# Patient Record
Sex: MALE | Race: WHITE | NOT HISPANIC OR LATINO | Employment: STUDENT | URBAN - METROPOLITAN AREA
[De-identification: names, ages, dates, MRNs, and addresses within clinical notes are randomized per-mention and may not be internally consistent; named-entity substitution may affect disease eponyms.]

---
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
## 2019-01-03 ENCOUNTER — EVALUATION (OUTPATIENT)
Dept: PHYSICAL THERAPY | Facility: CLINIC | Age: 19
End: 2019-01-03
Payer: COMMERCIAL

## 2019-01-03 DIAGNOSIS — S63.501D SPRAIN OF RIGHT WRIST, SUBSEQUENT ENCOUNTER: Primary | ICD-10-CM

## 2019-01-03 PROCEDURE — G8992 OTHER PT/OT  D/C STATUS: HCPCS | Performed by: PHYSICAL THERAPIST

## 2019-01-03 PROCEDURE — 97112 NEUROMUSCULAR REEDUCATION: CPT | Performed by: PHYSICAL THERAPIST

## 2019-01-03 PROCEDURE — G8991 OTHER PT/OT GOAL STATUS: HCPCS | Performed by: PHYSICAL THERAPIST

## 2019-01-03 NOTE — PROGRESS NOTES
PT Discharge    Today's date: 1/3/2019  Patient name: Felipe Agee  : 2000  MRN: [id number]  Referring provider: Adina Hong MD  Dx:   Encounter Diagnosis     ICD-10-CM    1  Sprain of right wrist, subsequent encounter S63 501D                   Assessment  Assessment details: Patient is a 22 y o  male who presents with a resolution of his impairments  He will be d/c from PT at this time  Thank you for the referral    Understanding of Dx/Px/POC: good   Prognosis: good    Goals  Impairment Goals  - Decrease pain by 50% in 2 weeks  - met  - Increased PROM throughout right wristall planes to WNL in 4 weeks  - met  - Increase right wrist strength golbally to 5/5 in 4 weeks  - met  -  strength will be symmetrical B in 4 weeks  - met    Functional Goals  - Return to Prior Level of Function in 4 weeks  - met  - Patient will be independent with HEP in 4 weeks  - met    Plan  Plan details: D/C from PT  Frequency: 2-3x week  Treatment plan discussed with: patient        Subjective Evaluation    History of Present Illness  Mechanism of injury: Pt notes a 100% improvement  He states he has been cleared by MD to slowly return to working out  He has no c/o pain and states he has been bench pressing without issues  Pain  Current pain ratin  At best pain ratin  At worst pain ratin  Location: R wrist      Diagnostic Tests  X-ray: abnormal    FCE comments:  Slight widening of the scapholunate interval upon review of his x-ray visible on the AP view  Patient Goals  Patient goals for therapy: decreased pain, independence with ADLs/IADLs, return to sport/leisure activities, increased strength and increased motion          Objective     Palpation     Additional Palpation Details  Mild tenderness at the dorsum scaphoidlunate interval  Pt to f/u with MD if this does not continue to improve or becomes worse      Neurological Testing     Sensation     Wrist/Hand   Left   Intact: light touch    Right Intact: light touch    Reflexes   Left   Deltoid (C5): normal (2+)  Biceps (C5/C6): normal (2+)  Brachioradialis (C6): normal (2+)  Triceps (C7): normal (2+)    Right   Deltoid (C5): normal (2+)  Biceps (C5/C6): normal (2+)  Brachioradialis (C6): normal (2+)  Triceps (C7): normal (2+)    Active Range of Motion     Right Wrist   Wrist flexion: WFL  Wrist extension: WFl  Radial deviation: WFL  Ulnar deviation: WFL    Passive Range of Motion     Right Wrist   Wrist flexion: WFL  Wrist extension: WFL  Radial deviation: WFL  Ulnar deviation: WFL     Strength/Myotome Testing     Left Wrist/Hand      (2nd hand position)     Trial 1: 85    Trial 2: 85    Trial 3: 85    Comments: At neutral    Right Wrist/Hand   Wrist extension: 5  Wrist flexion: 5  Radial deviation: 5  Ulnar deviation: 5     (2nd hand position)     Trial 1: 75    Trial 2: 70    Trial 3: 70    Comments: At neutral    Tests     Right Wrist/Hand   Negative distal radial-ulnar joint stress, scapholunate shear and Solano's  Swelling     Left Wrist/Hand   Circumference wrist: 16 cm    Right Wrist/Hand   Circumference wrist: 16 cm      Flowsheet Rows      Most Recent Value   PT/OT G-Codes   Current Score  74   Projected Score  80   FOTO information reviewed  Yes   Assessment Type  Discharge   G code set  Other PT/OT Primary   Other PT Primary Goal Status ()  CJ   Other PT Primary Discharge Status ()  [address] [place] [address]          Daily Treatment Diary   Re-eval only

## 2019-01-18 ENCOUNTER — OFFICE VISIT (OUTPATIENT)
Dept: OBGYN CLINIC | Facility: CLINIC | Age: 19
End: 2019-01-18
Payer: COMMERCIAL

## 2019-01-18 VITALS — BODY MASS INDEX: 27.87 KG/M2 | HEIGHT: 72 IN | WEIGHT: 205.8 LBS

## 2019-01-18 DIAGNOSIS — M25.531 PAIN IN RIGHT WRIST: Primary | ICD-10-CM

## 2019-01-18 PROCEDURE — 99213 OFFICE O/P EST LOW 20 MIN: CPT | Performed by: ORTHOPAEDIC SURGERY

## 2019-01-18 NOTE — PROGRESS NOTES
Assessment/Plan:  1  Pain in right wrist  MRI wrist right wo contrast       The patient unfortunately is still suffering with pain almost 2 months out from his injury despite extensive conservative treatment thus far  We are ordering an MRI to rule out a subtle scaphoid fracture that may not have been visible on x-ray and also a scapholunate ligament tear  He will follow up with our hand specialist, Dr Alyson Larose, after this study has been done read to discuss the results and how to proceed  Subjective:   Gray Starr is a 22 y o  male who presents today for follow-up of his right wrist   It has now been almost 2 months since he injured his wrist while weight lifting  He has done extensive therapy for his hand as well as bracing and ice  He had initially been making some progress, however he feels he has plateaued in still notes pain about the radial aspect of the wrist which is worse with activity and slightly better with rest   He is unable to do heavy lifting due to his wrist pain  He notes good sensation of the right hand and fair range of motion of the wrist       Review of Systems   Constitutional: Negative for chills, fever and unexpected weight change  HENT: Negative for hearing loss, nosebleeds and sore throat  Eyes: Negative for pain, redness and visual disturbance  Respiratory: Negative for cough, shortness of breath and wheezing  Cardiovascular: Negative for chest pain, palpitations and leg swelling  Gastrointestinal: Negative for abdominal pain, nausea and vomiting  Endocrine: Negative for polyphagia and polyuria  Genitourinary: Negative for dysuria and hematuria  Musculoskeletal:        See HPI   Skin: Negative for rash and wound  Neurological: Negative for dizziness, numbness and headaches  Psychiatric/Behavioral: Negative for decreased concentration and suicidal ideas  The patient is not nervous/anxious  No past medical history on file      Past Surgical History:   Procedure Laterality Date    EYE SURGERY      HERNIA REPAIR      WISDOM TOOTH EXTRACTION         Family History   Problem Relation Age of Onset    No Known Problems Mother     Hypertension Father     Depression Father     No Known Problems Brother     COPD Paternal Grandfather     Hypertension Paternal Grandfather     Arthritis Family     Cancer Family     Osteoporosis Family     No Known Problems Sister     No Known Problems Maternal Aunt     No Known Problems Maternal Uncle     No Known Problems Paternal Aunt     No Known Problems Paternal Uncle     No Known Problems Maternal Grandmother     No Known Problems Maternal Grandfather     No Known Problems Paternal Grandmother     ADD / ADHD Neg Hx     Anesthesia problems Neg Hx     Clotting disorder Neg Hx     Collagen disease Neg Hx     Diabetes Neg Hx     Dislocations Neg Hx     Learning disabilities Neg Hx     Neurological problems Neg Hx     Rheumatologic disease Neg Hx     Scoliosis Neg Hx     Vascular Disease Neg Hx        Social History     Occupational History    Not on file  Social History Main Topics    Smoking status: Never Smoker    Smokeless tobacco: Never Used    Alcohol use No    Drug use: No    Sexual activity: Not on file       No current outpatient prescriptions on file  Allergies   Allergen Reactions    Cefzil [Cefprozil]        Objective: There were no vitals filed for this visit  Ortho Exam    Physical Exam   Constitutional: He is oriented to person, place, and time  He appears well-developed  HENT:   Head: Normocephalic and atraumatic  Eyes: Conjunctivae are normal    Neck: Neck supple  Cardiovascular: Intact distal pulses  Pulmonary/Chest: Effort normal  No respiratory distress  Neurological: He is alert and oriented to person, place, and time  Skin: Skin is warm and dry  Psychiatric: He has a normal mood and affect  His behavior is normal    Vitals reviewed        Right wrist:  Benign appearance  No appreciable swelling  Near full range of motion of the wrist   Tenderness over dorsa scaphoid and scapholunate ligament  Negative Solano test  Sensation intact  2+ Radial pulse

## 2019-03-09 ENCOUNTER — HOSPITAL ENCOUNTER (OUTPATIENT)
Dept: MRI IMAGING | Facility: HOSPITAL | Age: 19
Discharge: HOME | End: 2019-03-09
Payer: COMMERCIAL

## 2019-03-09 DIAGNOSIS — M25.531 PAIN IN RIGHT WRIST: ICD-10-CM

## 2019-03-09 PROCEDURE — 73221 MRI JOINT UPR EXTREM W/O DYE: CPT

## 2019-03-14 ENCOUNTER — OFFICE VISIT (OUTPATIENT)
Dept: URGENT CARE | Facility: CLINIC | Age: 19
End: 2019-03-14
Payer: COMMERCIAL

## 2019-03-14 VITALS
SYSTOLIC BLOOD PRESSURE: 132 MMHG | WEIGHT: 202.4 LBS | OXYGEN SATURATION: 99 % | HEART RATE: 61 BPM | TEMPERATURE: 98.8 F | DIASTOLIC BLOOD PRESSURE: 70 MMHG | RESPIRATION RATE: 20 BRPM | HEIGHT: 74 IN | BODY MASS INDEX: 25.98 KG/M2

## 2019-03-14 DIAGNOSIS — R11.0 NAUSEA: Primary | ICD-10-CM

## 2019-03-14 PROCEDURE — 99203 OFFICE O/P NEW LOW 30 MIN: CPT | Performed by: NURSE PRACTITIONER

## 2019-03-14 PROCEDURE — S9088 SERVICES PROVIDED IN URGENT: HCPCS | Performed by: NURSE PRACTITIONER

## 2019-03-14 RX ORDER — ONDANSETRON 4 MG/1
4 TABLET, ORALLY DISINTEGRATING ORAL EVERY 8 HOURS PRN
Qty: 20 TABLET | Refills: 0 | Status: SHIPPED | OUTPATIENT
Start: 2019-03-14 | End: 2019-04-23 | Stop reason: ALTCHOICE

## 2019-03-14 NOTE — PATIENT INSTRUCTIONS
Zofran every 8 hours as needed - wait 30-45 min before eating/drinking  Increase fluids   Serena diet   Eliminate additional unnecessary supplements   Follow up with your PCP for worsening symptoms     Acute Nausea and Vomiting   WHAT YOU NEED TO KNOW:   Acute nausea and vomiting start suddenly, worsen quickly, and last a short time  DISCHARGE INSTRUCTIONS:   Return to the emergency department if:   · You see blood in your vomit or your bowel movements  · You have sudden, severe pain in your chest and upper abdomen after hard vomiting or retching  · You have swelling in your neck and chest      · You are dizzy, cold, and thirsty and your eyes and mouth are dry  · You are urinating very little or not at all  · You have muscle weakness, leg cramps, and trouble breathing  · Your heart is beating much faster than normal      · You continue to vomit for more than 48 hours  Contact your healthcare provider if:   · You have frequent dry heaves (vomiting but nothing comes out)  · Your nausea and vomiting does not get better or go away after you use medicine  · You have questions or concerns about your condition or treatment  Medicines: You may need any of the following:  · Medicines  may be given to calm your stomach and stop your vomiting  You may also need medicines to help you feel more relaxed or to stop nausea and vomiting caused by motion sickness  · Gastrointestinal stimulants  are used to help empty your stomach and bowels  This may help decrease nausea and vomiting  · Take your medicine as directed  Contact your healthcare provider if you think your medicine is not helping or if you have side effects  Tell him or her if you are allergic to any medicine  Keep a list of the medicines, vitamins, and herbs you take  Include the amounts, and when and why you take them  Bring the list or the pill bottles to follow-up visits   Carry your medicine list with you in case of an emergency  Prevent or manage acute nausea and vomiting:   · Do not drink alcohol  Alcohol may upset or irritate your stomach  Too much alcohol can also cause acute nausea and vomiting  · Control stress  Headaches due to stress may cause nausea and vomiting  Find ways to relax and manage your stress  Get more rest and sleep  · Drink more liquids as directed  Vomiting can lead to dehydration  It is important to drink more liquids to help replace lost body fluids  Ask your healthcare provider how much liquid to drink each day and which liquids are best for you  Your provider may recommend that you drink an oral rehydration solution (ORS)  ORS contains water, salts, and sugar that are needed to replace the lost body fluids  Ask what kind of ORS to use, how much to drink, and where to get it  · Eat smaller meals, more often  Eat small amounts of food every 2 to 3 hours, even if you are not hungry  Food in your stomach may decrease your nausea  · Talk to your healthcare provider before you take over-the-counter (OTC) medicines  These medicines can cause serious problems if you use certain other medicines, or you have a medical condition  You may have problems if you use too much or use them for longer than the label says  Follow directions on the label carefully  Follow up with your healthcare provider as directed:  Write down your questions so you remember to ask them during your follow-up visits  © 2017 [employer] Information is for End User's use only and may not be sold, redistributed or otherwise used for commercial purposes  All illustrations and images included in CareNotes® are the copyrighted property of [employer]  or [employer]  The above information is an  only  It is not intended as medical advice for individual conditions or treatments   Talk to your doctor, nurse or pharmacist before following any medical regimen to see if it is safe and effective for you

## 2019-03-15 NOTE — PROGRESS NOTES
[facility]      NAME: Myrtle Sanders is a 22 y o  male  : 2000    MRN: [id number]  DATE: 2019  TIME: 8:09 PM    Assessment and Plan   Nausea [R11 0]  1  Nausea  ondansetron (ZOFRAN-ODT) 4 mg disintegrating tablet       Patient Instructions   Zofran every 8 hours as needed - wait 30-45 min before eating/drinking  Increase fluids   Tyler diet   Eliminate additional unnecessary supplements   Follow up with your PCP for worsening symptoms     Chief Complaint     Chief Complaint   Patient presents with    Nausea     fatigue and HA x 3 days      History of Present Illness       Patient is a 50-year-old male presenting with chills, night sweats, decreased appetite, and nausea for the past 3 days  He denies fever, sore throat, or ear pain  He has mild epigastric pain but denies vomiting  He has had similar symptoms in the past but was never evaluated  He took Advil at 9 o'clock this morning with no relief of symptoms  He states that he has been using a pre workout supplement and thinks that may be related to his symptoms  Review of Systems   Review of Systems   Constitutional: Positive for appetite change, chills and diaphoresis  Negative for activity change and fever  HENT: Negative for congestion, ear pain, sinus pain and sore throat  Respiratory: Negative for cough and shortness of breath  Gastrointestinal: Positive for abdominal pain and nausea  Negative for diarrhea and vomiting  Skin: Negative for color change and wound  Neurological: Negative for weakness, numbness and headaches           Current Medications     Current Outpatient Medications:     ondansetron (ZOFRAN-ODT) 4 mg disintegrating tablet, Take 1 tablet (4 mg total) by mouth every 8 (eight) hours as needed for nausea or vomiting, Disp: 20 tablet, Rfl: 0    Current Allergies     Allergies as of 2019 - Reviewed 2019   Allergen Reaction Noted    Cefzil [cefprozil]  2016            The following portions of the patient's history were reviewed and updated as appropriate: allergies, current medications, past family history, past medical history, past social history, past surgical history and problem list      History reviewed  No pertinent past medical history  Past Surgical History:   Procedure Laterality Date    EYE SURGERY      HERNIA REPAIR      WISDOM TOOTH EXTRACTION         Family History   Problem Relation Age of Onset    No Known Problems Mother     Hypertension Father     Depression Father     No Known Problems Brother     COPD Paternal Grandfather     Hypertension Paternal Grandfather     Arthritis Family     Cancer Family     Osteoporosis Family     No Known Problems Sister     No Known Problems Maternal Aunt     No Known Problems Maternal Uncle     No Known Problems Paternal Aunt     No Known Problems Paternal Uncle     No Known Problems Maternal Grandmother     No Known Problems Maternal Grandfather     No Known Problems Paternal Grandmother     ADD / ADHD Neg Hx     Anesthesia problems Neg Hx     Clotting disorder Neg Hx     Collagen disease Neg Hx     Diabetes Neg Hx     Dislocations Neg Hx     Learning disabilities Neg Hx     Neurological problems Neg Hx     Rheumatologic disease Neg Hx     Scoliosis Neg Hx     Vascular Disease Neg Hx          Medications have been verified  Objective   /70 (BP Location: Right arm, Patient Position: Sitting, Cuff Size: Standard)   Pulse 61   Temp 98 8 °F (37 1 °C) (Temporal)   Resp 20   Ht 6' 2" (1 88 m)   Wt 91 8 kg (202 lb 6 4 oz)   SpO2 99%   BMI 25 99 kg/m²        Physical Exam     Physical Exam   Constitutional: He is oriented to person, place, and time  Vital signs are normal  He appears well-developed and well-nourished  He is active  HENT:   Head: Normocephalic     Right Ear: Tympanic membrane, external ear and ear canal normal    Left Ear: Tympanic membrane, external ear and ear canal normal    Nose: Nose normal    Mouth/Throat: Oropharynx is clear and moist    Cardiovascular: Normal rate, regular rhythm, S1 normal, S2 normal and normal heart sounds  Pulmonary/Chest: Effort normal  No respiratory distress  He has no decreased breath sounds  He has no wheezes  He has no rhonchi  He has no rales  Neurological: He is alert and oriented to person, place, and time  GCS eye subscore is 4  GCS verbal subscore is 5  GCS motor subscore is 6  Skin: Skin is warm and dry

## 2019-03-16 ENCOUNTER — HOSPITAL ENCOUNTER (EMERGENCY)
Facility: HOSPITAL | Age: 19
Discharge: HOME | End: 2019-03-16
Attending: EMERGENCY MEDICINE | Admitting: EMERGENCY MEDICINE
Payer: COMMERCIAL

## 2019-03-16 ENCOUNTER — APPOINTMENT (EMERGENCY)
Dept: CT IMAGING | Facility: HOSPITAL | Age: 19
End: 2019-03-16
Payer: COMMERCIAL

## 2019-03-16 VITALS
OXYGEN SATURATION: 99 % | TEMPERATURE: 97.7 F | WEIGHT: 203.93 LBS | DIASTOLIC BLOOD PRESSURE: 65 MMHG | HEART RATE: 53 BPM | BODY MASS INDEX: 27.03 KG/M2 | HEIGHT: 73 IN | SYSTOLIC BLOOD PRESSURE: 117 MMHG | RESPIRATION RATE: 18 BRPM

## 2019-03-16 DIAGNOSIS — R11.0 NAUSEA: ICD-10-CM

## 2019-03-16 DIAGNOSIS — R10.13 EPIGASTRIC ABDOMINAL PAIN: Primary | ICD-10-CM

## 2019-03-16 LAB
ALBUMIN SERPL BCP-MCNC: 4.1 G/DL (ref 3.5–5)
ALP SERPL-CCNC: 128 U/L (ref 46–484)
ALT SERPL W P-5'-P-CCNC: 53 U/L (ref 12–78)
ANION GAP SERPL CALCULATED.3IONS-SCNC: 13 MMOL/L (ref 4–13)
AST SERPL W P-5'-P-CCNC: 41 U/L (ref 5–45)
BACTERIA UR QL AUTO: NORMAL /HPF
BASOPHILS # BLD AUTO: 0.03 THOUSANDS/ΜL (ref 0–0.1)
BASOPHILS NFR BLD AUTO: 1 % (ref 0–1)
BILIRUB SERPL-MCNC: 0.3 MG/DL (ref 0.2–1)
BILIRUB UR QL STRIP: NEGATIVE
BUN SERPL-MCNC: 17 MG/DL (ref 5–25)
CALCIUM SERPL-MCNC: 9.5 MG/DL (ref 8.3–10.1)
CHLORIDE SERPL-SCNC: 104 MMOL/L (ref 100–108)
CLARITY UR: CLEAR
CO2 SERPL-SCNC: 26 MMOL/L (ref 21–32)
COLOR UR: YELLOW
CREAT SERPL-MCNC: 1.28 MG/DL (ref 0.6–1.3)
EOSINOPHIL # BLD AUTO: 0.09 THOUSAND/ΜL (ref 0–0.61)
EOSINOPHIL NFR BLD AUTO: 1 % (ref 0–6)
ERYTHROCYTE [DISTWIDTH] IN BLOOD BY AUTOMATED COUNT: 11.9 % (ref 11.6–15.1)
GFR SERPL CREATININE-BSD FRML MDRD: 81 ML/MIN/1.73SQ M
GLUCOSE SERPL-MCNC: 87 MG/DL (ref 65–140)
GLUCOSE UR STRIP-MCNC: NEGATIVE MG/DL
HCT VFR BLD AUTO: 45.9 % (ref 36.5–49.3)
HGB BLD-MCNC: 15.2 G/DL (ref 12–17)
HGB UR QL STRIP.AUTO: ABNORMAL
IMM GRANULOCYTES # BLD AUTO: 0.01 THOUSAND/UL (ref 0–0.2)
IMM GRANULOCYTES NFR BLD AUTO: 0 % (ref 0–2)
KETONES UR STRIP-MCNC: NEGATIVE MG/DL
LEUKOCYTE ESTERASE UR QL STRIP: NEGATIVE
LIPASE SERPL-CCNC: 151 U/L (ref 73–393)
LYMPHOCYTES # BLD AUTO: 2.42 THOUSANDS/ΜL (ref 0.6–4.47)
LYMPHOCYTES NFR BLD AUTO: 39 % (ref 14–44)
MCH RBC QN AUTO: 28.8 PG (ref 26.8–34.3)
MCHC RBC AUTO-ENTMCNC: 33.1 G/DL (ref 31.4–37.4)
MCV RBC AUTO: 87 FL (ref 82–98)
MONOCYTES # BLD AUTO: 0.5 THOUSAND/ΜL (ref 0.17–1.22)
MONOCYTES NFR BLD AUTO: 8 % (ref 4–12)
NEUTROPHILS # BLD AUTO: 3.23 THOUSANDS/ΜL (ref 1.85–7.62)
NEUTS SEG NFR BLD AUTO: 51 % (ref 43–75)
NITRITE UR QL STRIP: NEGATIVE
NON-SQ EPI CELLS URNS QL MICRO: NORMAL /HPF
NRBC BLD AUTO-RTO: 0 /100 WBCS
PH UR STRIP.AUTO: 6.5 [PH]
PLATELET # BLD AUTO: 288 THOUSANDS/UL (ref 149–390)
PMV BLD AUTO: 9.6 FL (ref 8.9–12.7)
POTASSIUM SERPL-SCNC: 4 MMOL/L (ref 3.5–5.3)
PROT SERPL-MCNC: 7.9 G/DL (ref 6.4–8.2)
PROT UR STRIP-MCNC: NEGATIVE MG/DL
RBC # BLD AUTO: 5.28 MILLION/UL (ref 3.88–5.62)
RBC #/AREA URNS AUTO: NORMAL /HPF
SODIUM SERPL-SCNC: 143 MMOL/L (ref 136–145)
SP GR UR STRIP.AUTO: 1.02 (ref 1–1.03)
UROBILINOGEN UR QL STRIP.AUTO: 0.2 E.U./DL
WBC # BLD AUTO: 6.28 THOUSAND/UL (ref 4.31–10.16)
WBC #/AREA URNS AUTO: NORMAL /HPF

## 2019-03-16 PROCEDURE — 99284 EMERGENCY DEPT VISIT MOD MDM: CPT

## 2019-03-16 PROCEDURE — 96375 TX/PRO/DX INJ NEW DRUG ADDON: CPT

## 2019-03-16 PROCEDURE — 81001 URINALYSIS AUTO W/SCOPE: CPT | Performed by: PHYSICIAN ASSISTANT

## 2019-03-16 PROCEDURE — 36415 COLL VENOUS BLD VENIPUNCTURE: CPT | Performed by: PHYSICIAN ASSISTANT

## 2019-03-16 PROCEDURE — 80053 COMPREHEN METABOLIC PANEL: CPT | Performed by: PHYSICIAN ASSISTANT

## 2019-03-16 PROCEDURE — 74177 CT ABD & PELVIS W/CONTRAST: CPT

## 2019-03-16 PROCEDURE — 96361 HYDRATE IV INFUSION ADD-ON: CPT

## 2019-03-16 PROCEDURE — 96374 THER/PROPH/DIAG INJ IV PUSH: CPT

## 2019-03-16 PROCEDURE — 85025 COMPLETE CBC W/AUTO DIFF WBC: CPT | Performed by: PHYSICIAN ASSISTANT

## 2019-03-16 PROCEDURE — 83690 ASSAY OF LIPASE: CPT | Performed by: PHYSICIAN ASSISTANT

## 2019-03-16 RX ORDER — FAMOTIDINE 20 MG/1
20 TABLET, FILM COATED ORAL EVERY 12 HOURS PRN
Qty: 14 TABLET | Refills: 0 | Status: SHIPPED | OUTPATIENT
Start: 2019-03-16 | End: 2019-04-23 | Stop reason: ALTCHOICE

## 2019-03-16 RX ORDER — KETOROLAC TROMETHAMINE 30 MG/ML
30 INJECTION, SOLUTION INTRAMUSCULAR; INTRAVENOUS ONCE
Status: COMPLETED | OUTPATIENT
Start: 2019-03-16 | End: 2019-03-16

## 2019-03-16 RX ORDER — ONDANSETRON 2 MG/ML
4 INJECTION INTRAMUSCULAR; INTRAVENOUS ONCE
Status: COMPLETED | OUTPATIENT
Start: 2019-03-16 | End: 2019-03-16

## 2019-03-16 RX ADMIN — IOHEXOL 100 ML: 350 INJECTION, SOLUTION INTRAVENOUS at 20:03

## 2019-03-16 RX ADMIN — ONDANSETRON 4 MG: 2 INJECTION INTRAMUSCULAR; INTRAVENOUS at 18:31

## 2019-03-16 RX ADMIN — SODIUM CHLORIDE 1000 ML: 0.9 INJECTION, SOLUTION INTRAVENOUS at 18:31

## 2019-03-16 RX ADMIN — KETOROLAC TROMETHAMINE 30 MG: 30 INJECTION, SOLUTION INTRAMUSCULAR; INTRAVENOUS at 18:35

## 2019-03-16 NOTE — ED PROVIDER NOTES
History  Chief Complaint   Patient presents with    Abdominal Pain     Pt presents to ED from home w/ umbilical abd pain intermittently for three days, increasing at night  Pt (+) nausea  Pt seen by urgent care w/ no relief  Pt (-) health hx  Tuan Pacheco is a 22 y o  male who presents to the ED with complaints of constant epigastric abdominal pain, nausea, decreased appetitie and night sweats x 5 days  Patient states he had a previous episode of similar pain as a child which resolved on its own  Patient was seen at urgent care on 03/14/19, prescribed Zofran without relief  Patient denies association with food or eating  Patient is using a new pre work-out which he has been taking over the past 3 weeks  Denies vomiting, diarrhea, blood in stool, urinary frequency, urinary urgency some hematuria, dysuria, penile pain, penile discharge, testicular pain, recent unprotected sex, cough, sore throat, nasal congestion, fever  Patient is up-to-date on vaccinations  History provided by:  Parent and patient  Abdominal Pain   Pain location:  Epigastric  Pain quality: aching and throbbing    Pain severity:  Severe  Onset quality:  Sudden  Duration:  5 days  Timing:  Constant  Progression:  Unchanged  Context: previous surgery    Context: not alcohol use, not recent sexual activity and not sick contacts    Ineffective treatments:  NSAIDs  Associated symptoms: anorexia, chills and nausea    Associated symptoms: no belching, no chest pain, no constipation, no cough, no diarrhea, no dysuria, no fatigue, no fever, no flatus, no hematemesis, no hematochezia, no hematuria, no melena, no shortness of breath, no sore throat and no vomiting        Prior to Admission Medications   Prescriptions Last Dose Informant Patient Reported?  Taking?   ondansetron (ZOFRAN-ODT) 4 mg disintegrating tablet   No No   Sig: Take 1 tablet (4 mg total) by mouth every 8 (eight) hours as needed for nausea or vomiting Facility-Administered Medications: None       History reviewed  No pertinent past medical history  Past Surgical History:   Procedure Laterality Date    EYE SURGERY      HERNIA REPAIR      WISDOM TOOTH EXTRACTION         Family History   Problem Relation Age of Onset    No Known Problems Mother     Hypertension Father     Depression Father     No Known Problems Brother     COPD Paternal Grandfather     Hypertension Paternal Grandfather     Arthritis Family     Cancer Family     Osteoporosis Family     No Known Problems Sister     No Known Problems Maternal Aunt     No Known Problems Maternal Uncle     No Known Problems Paternal Aunt     No Known Problems Paternal Uncle     No Known Problems Maternal Grandmother     No Known Problems Maternal Grandfather     No Known Problems Paternal Grandmother     ADD / ADHD Neg Hx     Anesthesia problems Neg Hx     Clotting disorder Neg Hx     Collagen disease Neg Hx     Diabetes Neg Hx     Dislocations Neg Hx     Learning disabilities Neg Hx     Neurological problems Neg Hx     Rheumatologic disease Neg Hx     Scoliosis Neg Hx     Vascular Disease Neg Hx      I have reviewed and agree with the history as documented  Social History     Tobacco Use    Smoking status: Never Smoker    Smokeless tobacco: Never Used   Substance Use Topics    Alcohol use: No    Drug use: No        Review of Systems   Constitutional: Positive for appetite change and chills  Negative for fatigue, fever and unexpected weight change  HENT: Negative for congestion, drooling, ear pain, rhinorrhea, sore throat, trouble swallowing and voice change  Eyes: Negative for pain, discharge, redness and visual disturbance  Respiratory: Negative for cough, shortness of breath, wheezing and stridor  Cardiovascular: Negative for chest pain, palpitations and leg swelling  Gastrointestinal: Positive for abdominal pain, anorexia and nausea   Negative for blood in stool, constipation, diarrhea, flatus, hematemesis, hematochezia, melena and vomiting  Genitourinary: Negative for dysuria, flank pain, frequency, hematuria and urgency  Musculoskeletal: Negative for gait problem, joint swelling, neck pain and neck stiffness  Skin: Negative for color change and rash  Neurological: Negative for dizziness, seizures, light-headedness and headaches  Physical Exam  Physical Exam   Constitutional: He is oriented to person, place, and time  He appears well-developed and well-nourished  No distress  HENT:   Head: Normocephalic and atraumatic  Nose: Nose normal    Mouth/Throat: Oropharynx is clear and moist    Eyes: Pupils are equal, round, and reactive to light  Conjunctivae and EOM are normal    Neck: Normal range of motion  Neck supple  Cardiovascular: Normal rate, regular rhythm and intact distal pulses  Pulmonary/Chest: Effort normal and breath sounds normal    Abdominal: Soft  Bowel sounds are normal  There is tenderness in the epigastric area and periumbilical area  There is tenderness at McBurney's point  There is no rigidity, no rebound, no guarding and no CVA tenderness  No peritoneal signs  Musculoskeletal: Normal range of motion  Neurological: He is alert and oriented to person, place, and time  He has normal strength  GCS eye subscore is 4  GCS verbal subscore is 5  GCS motor subscore is 6  Skin: Skin is warm and dry  Capillary refill takes less than 2 seconds  Psychiatric: He has a normal mood and affect  Nursing note and vitals reviewed        Vital Signs  ED Triage Vitals [03/16/19 1734]   Temperature Pulse Respirations Blood Pressure SpO2   97 7 °F (36 5 °C) 67 16 152/65 98 %      Temp Source Heart Rate Source Patient Position - Orthostatic VS BP Location FiO2 (%)   Oral Monitor Sitting Left arm --      Pain Score       8           Vitals:    03/16/19 1734 03/16/19 1857 03/16/19 2144   BP: 152/65 119/69 117/65   Pulse: 67 (!) 53 (!) 53 Patient Position - Orthostatic VS: Sitting Lying Lying       qSOFA     Row Name 03/16/19 2144 03/16/19 1857 03/16/19 1734          Altered mental status GCS < 15  --  --  --      Respiratory Rate > / =22  0  0  0      Systolic BP < / =977  0  0  0      Q Sofa Score  0  0  0            Visual Acuity      ED Medications  Medications   sodium chloride 0 9 % bolus 1,000 mL (0 mL Intravenous Stopped 3/16/19 1931)   ondansetron (ZOFRAN) injection 4 mg (4 mg Intravenous Given 3/16/19 1831)   ketorolac (TORADOL) injection 30 mg (30 mg Intravenous Given 3/16/19 1835)   iohexol (OMNIPAQUE) 350 MG/ML injection (MULTI-DOSE) 100 mL (100 mL Intravenous Given 3/16/19 2003)       Diagnostic Studies  Results Reviewed     Procedure Component Value Units Date/Time    Urine Microscopic [[id number]]  (Normal) Collected:  03/16/19 1837    Lab Status:  Final result Specimen:  Urine, Clean Catch Updated:  03/16/19 1915     RBC, UA None Seen /hpf      WBC, UA None Seen /hpf      Epithelial Cells None Seen /hpf      Bacteria, UA None Seen /hpf     Comprehensive metabolic panel [[id number]] Collected:  03/16/19 1829    Lab Status:  Final result Specimen:  Blood from Arm, Right Updated:  03/16/19 1906     Sodium 143 mmol/L      Potassium 4 0 mmol/L      Chloride 104 mmol/L      CO2 26 mmol/L      ANION GAP 13 mmol/L      BUN 17 mg/dL      Creatinine 1 28 mg/dL      Glucose 87 mg/dL      Calcium 9 5 mg/dL      AST 41 U/L      ALT 53 U/L      Alkaline Phosphatase 128 U/L      Total Protein 7 9 g/dL      Albumin 4 1 g/dL      Total Bilirubin 0 30 mg/dL      eGFR 81 ml/min/1 73sq m     Narrative:       National Kidney Disease Education Program recommendations are as follows:  GFR calculation is accurate only with a steady state creatinine  Chronic Kidney disease less than 60 ml/min/1 73 sq  meters  Kidney failure less than 15 ml/min/1 73 sq  meters      Lipase [[id number]]  (Normal) Collected:  03/16/19 1829    Lab Status:  Final result Specimen: Blood from Arm, Right Updated:  03/16/19 1906     Lipase 151 u/L     UA w Reflex to Microscopic [[id number]]  (Abnormal) Collected:  03/16/19 1837    Lab Status:  Final result Specimen:  Urine, Clean Catch Updated:  03/16/19 1853     Color, UA Yellow     Clarity, UA Clear     Specific [place], UA 1 020     pH, UA 6 5     Leukocytes, UA Negative     Nitrite, UA Negative     Protein, UA Negative mg/dl      Glucose, UA Negative mg/dl      Ketones, UA Negative mg/dl      Urobilinogen, UA 0 2 E U /dl      Bilirubin, UA Negative     Blood, UA Trace-lysed    CBC and differential [[id number]] Collected:  03/16/19 1829    Lab Status:  Final result Specimen:  Blood from Arm, Right Updated:  03/16/19 1841     WBC 6 28 Thousand/uL      RBC 5 28 Million/uL      Hemoglobin 15 2 g/dL      Hematocrit 45 9 %      MCV 87 fL      MCH 28 8 pg      MCHC 33 1 g/dL      RDW 11 9 %      MPV 9 6 fL      Platelets 854 Thousands/uL      nRBC 0 /100 WBCs      Neutrophils Relative 51 %      Immat GRANS % 0 %      Lymphocytes Relative 39 %      Monocytes Relative 8 %      Eosinophils Relative 1 %      Basophils Relative 1 %      Neutrophils Absolute 3 23 Thousands/µL      Immature Grans Absolute 0 01 Thousand/uL      Lymphocytes Absolute 2 42 Thousands/µL      Monocytes Absolute 0 50 Thousand/µL      Eosinophils Absolute 0 09 Thousand/µL      Basophils Absolute 0 03 Thousands/µL                  CT abdomen pelvis with contrast   Final Result by El Baltazar MD (03/16 2124)         No acute pathology visualized on CT of the abdomen and pelvis with IV without oral contrast           Workstation performed: [id number]                    Procedures  Procedures       Phone Contacts  ED Phone Contact    ED Course  ED Course as of Mar 16 2244   Sat Mar 16, 2019   2135 Educated patient regarding diagnosis and management  Advised patient to follow up with PCP  Advised patient to RTER for persistent or worsening symptoms  MDM  Number of Diagnoses or Management Options  Epigastric abdominal pain: new and does not require workup  Nausea: new and does not require workup  Diagnosis management comments: Differential diagnosis included but not limited to: GERD, appendicitis, gastroenteritis, colitis, PUD, pancreatitis, IBD, IBS     Labs WNL  Pain and nausea improved with IV medications  CT abdomen and pelvis without acute findings  Patient was instructed to follow up with outpatient gastroenterology  I provided patient with strict RTER precautions  I advised patient follow-up with PCP in 24-48 hours  Patient verbalized understanding  Amount and/or Complexity of Data Reviewed  Clinical lab tests: reviewed and ordered  Tests in the radiology section of CPT®: ordered and reviewed  Review and summarize past medical records: yes    Risk of Complications, Morbidity, and/or Mortality  Presenting problems: moderate  Diagnostic procedures: moderate  Management options: moderate    Patient Progress  Patient progress: improved      Disposition  Final diagnoses:   Epigastric abdominal pain   Nausea     Time reflects when diagnosis was documented in both MDM as applicable and the Disposition within this note     Time User Action Codes Description Comment    3/16/2019  9:32 PM Lizzy Serna Add [R10 13] Epigastric abdominal pain     3/16/2019  9:32 PM Lizzy Serna Add [R11 0] Nausea       ED Disposition     ED Disposition Condition Date/Time Comment    Discharge Stable Sat Mar 16, 2019  9:32 PM Flori Dubois discharge to home/self care              Follow-up Information     Follow up With Specialties Details Why Contact Info Additional [address] Emergency Department Emergency Medicine Go to  If symptoms worsen [address] [facility] [address] [phone] [facility] ED, [address], [place], [address]    Jayson Cockayne, MD Family Medicine Schedule an appointment as soon as possible for a visit   [address]  [facility] [address]  [phone]       [facility] [place] [facility] Schedule an appointment as soon as possible for a visit   [address] [facility] [address] [facility] [place], [address] [address] [place], [place], [phone]          Discharge Medication List as of 3/16/2019  9:33 PM      START taking these medications    Details   famotidine (PEPCID) 20 mg tablet Take 1 tablet (20 mg total) by mouth every 12 (twelve) hours as needed for indigestion or heartburn (Epigastric Pain), Starting Sat 3/16/2019, Print         CONTINUE these medications which have NOT CHANGED    Details   ondansetron (ZOFRAN-ODT) 4 mg disintegrating tablet Take 1 tablet (4 mg total) by mouth every 8 (eight) hours as needed for nausea or vomiting, Starting Thu 3/14/2019, Normal           No discharge procedures on file      ED Provider  Electronically Signed by           Evy Neumann PA-C  03/16/19 4614

## 2019-03-17 NOTE — DISCHARGE INSTRUCTIONS
Epigastric Pain   WHAT YOU NEED TO KNOW:   Epigastric pain is felt in the middle of the upper abdomen, between the ribs and the bellybutton  The pain may be mild or severe  Pain may spread from or to another part of your body  Epigastric pain may be a sign of a serious health problem that needs to be treated  DISCHARGE INSTRUCTIONS:   Call 911 for any of the following:   · You have any of the following signs of a heart attack:      ¨ Squeezing, pressure, or pain in your chest that lasts longer than 5 minutes or returns    ¨ Discomfort or pain in your back, neck, jaw, stomach, or arm     ¨ Trouble breathing    ¨ Nausea or vomiting    ¨ Lightheadedness or a sudden cold sweat, especially with chest pain or trouble breathing    · You have severe pain that radiates to your jaw or back  Return to the emergency department if:   · You have severe pain that starts suddenly and quickly gets worse  · You cannot have a bowel movement and are vomiting  · You vomit or cough up blood  · You see blood in your urine or bowel movement  · You feel drowsy and your breathing is slower than usual   Contact your healthcare provider if:   · You have a fever or chills  · You have yellowing of your skin or the whites of your eyes  · You vomit often or several times in a row  · You lose weight without trying  · You have symptoms for longer than 2 weeks  · You have questions or concerns about your condition or care  Medicines:   · Medicines  may be given to treat pain or stop vomiting  You may also need medicines to reduce or control stomach acid, or treat an infection  · Take your medicine as directed  Contact your healthcare provider if you think your medicine is not helping or if you have side effects  Tell him of her if you are allergic to any medicine  Keep a list of the medicines, vitamins, and herbs you take  Include the amounts, and when and why you take them   Bring the list or the pill bottles to follow-up visits  Carry your medicine list with you in case of an emergency  Follow up with your healthcare provider as directed:  Write down your questions so you remember to ask them during your visits  Manage your symptoms:   · Keep a record of your symptoms  Include when the pain starts, how long it lasts, and if it is sharp or dull  Also include any foods you ate or activities you did before the pain started  Keep track of anything that helped the pain  · Eat a variety of healthy foods  Healthy foods include fruits, vegetables, whole-grain breads, low-fat dairy products, beans, lean meats, and fish  Ask if you need to be on a special diet  Certain foods may cause your pain, such as alcohol or foods that are high in fat  You may need to eat smaller meals and to eat more often than usual     · Drink liquids as directed  Ask how much liquid to drink each day and which liquids are best for you  Do not have drinks that contain alcohol or caffeine  © 2017 [address] [facility] Information is for End User's use only and may not be sold, redistributed or otherwise used for commercial purposes  All illustrations and images included in CareNotes® are the copyrighted property of [employer]  or [employer]  The above information is an  only  It is not intended as medical advice for individual conditions or treatments  Talk to your doctor, nurse or pharmacist before following any medical regimen to see if it is safe and effective for you

## 2019-03-18 NOTE — ED NOTES
Patient left medication on ground in room 9  Contacted to make aware        Gian Pennington RN  03/17/19 2123

## 2019-04-23 ENCOUNTER — OFFICE VISIT (OUTPATIENT)
Dept: FAMILY MEDICINE CLINIC | Facility: CLINIC | Age: 19
End: 2019-04-23
Payer: COMMERCIAL

## 2019-04-23 VITALS
DIASTOLIC BLOOD PRESSURE: 68 MMHG | BODY MASS INDEX: 26.4 KG/M2 | WEIGHT: 199.2 LBS | TEMPERATURE: 98.6 F | SYSTOLIC BLOOD PRESSURE: 118 MMHG | OXYGEN SATURATION: 98 % | HEIGHT: 73 IN | RESPIRATION RATE: 18 BRPM | HEART RATE: 69 BPM

## 2019-04-23 DIAGNOSIS — R10.13 EPIGASTRIC PAIN: Primary | ICD-10-CM

## 2019-04-23 PROCEDURE — 3008F BODY MASS INDEX DOCD: CPT | Performed by: PHYSICIAN ASSISTANT

## 2019-04-23 PROCEDURE — 99214 OFFICE O/P EST MOD 30 MIN: CPT | Performed by: PHYSICIAN ASSISTANT

## 2019-04-23 RX ORDER — OMEPRAZOLE 20 MG/1
20 CAPSULE, DELAYED RELEASE ORAL DAILY
COMMUNITY
End: 2019-04-23 | Stop reason: SDUPTHER

## 2019-04-23 RX ORDER — OMEPRAZOLE 20 MG/1
20 CAPSULE, DELAYED RELEASE ORAL DAILY
Qty: 30 CAPSULE | Refills: 0 | Status: ON HOLD | OUTPATIENT
Start: 2019-04-23 | End: 2019-04-24 | Stop reason: DRUGHIGH

## 2019-04-24 ENCOUNTER — ANESTHESIA EVENT (OUTPATIENT)
Dept: GASTROENTEROLOGY | Facility: MEDICAL CENTER | Age: 19
End: 2019-04-24
Payer: COMMERCIAL

## 2019-04-24 ENCOUNTER — ANESTHESIA (OUTPATIENT)
Dept: GASTROENTEROLOGY | Facility: MEDICAL CENTER | Age: 19
End: 2019-04-24
Payer: COMMERCIAL

## 2019-04-24 ENCOUNTER — HOSPITAL ENCOUNTER (OUTPATIENT)
Facility: MEDICAL CENTER | Age: 19
Setting detail: OUTPATIENT SURGERY
Discharge: HOME | End: 2019-04-24
Attending: INTERNAL MEDICINE | Admitting: INTERNAL MEDICINE
Payer: COMMERCIAL

## 2019-04-24 VITALS
BODY MASS INDEX: 25.84 KG/M2 | DIASTOLIC BLOOD PRESSURE: 61 MMHG | TEMPERATURE: 99.2 F | OXYGEN SATURATION: 98 % | HEIGHT: 73 IN | RESPIRATION RATE: 23 BRPM | HEART RATE: 48 BPM | SYSTOLIC BLOOD PRESSURE: 128 MMHG | WEIGHT: 195 LBS

## 2019-04-24 DIAGNOSIS — R10.10 PAIN OF UPPER ABDOMEN: ICD-10-CM

## 2019-04-24 DIAGNOSIS — K20.90 ESOPHAGITIS: Primary | ICD-10-CM

## 2019-04-24 PROCEDURE — NC001 PR NO CHARGE: Performed by: INTERNAL MEDICINE

## 2019-04-24 PROCEDURE — 88305 TISSUE EXAM BY PATHOLOGIST: CPT | Performed by: PATHOLOGY

## 2019-04-24 PROCEDURE — 43239 EGD BIOPSY SINGLE/MULTIPLE: CPT | Performed by: INTERNAL MEDICINE

## 2019-04-24 RX ORDER — SODIUM CHLORIDE 9 MG/ML
125 INJECTION, SOLUTION INTRAVENOUS CONTINUOUS
Status: DISCONTINUED | OUTPATIENT
Start: 2019-04-24 | End: 2019-04-24 | Stop reason: HOSPADM

## 2019-04-24 RX ORDER — OMEPRAZOLE 40 MG/1
40 CAPSULE, DELAYED RELEASE ORAL DAILY
Qty: 30 CAPSULE | Refills: 3 | Status: SHIPPED | OUTPATIENT
Start: 2019-04-24 | End: 2019-04-24 | Stop reason: SDUPTHER

## 2019-04-24 RX ORDER — OMEPRAZOLE 40 MG/1
40 CAPSULE, DELAYED RELEASE ORAL DAILY
Qty: 30 CAPSULE | Refills: 3 | Status: SHIPPED | OUTPATIENT
Start: 2019-04-24 | End: 2019-07-16

## 2019-04-24 RX ORDER — LIDOCAINE HYDROCHLORIDE 20 MG/ML
INJECTION, SOLUTION EPIDURAL; INFILTRATION; INTRACAUDAL; PERINEURAL AS NEEDED
Status: DISCONTINUED | OUTPATIENT
Start: 2019-04-24 | End: 2019-04-24 | Stop reason: SURG

## 2019-04-24 RX ORDER — PROPOFOL 10 MG/ML
INJECTION, EMULSION INTRAVENOUS AS NEEDED
Status: DISCONTINUED | OUTPATIENT
Start: 2019-04-24 | End: 2019-04-24 | Stop reason: SURG

## 2019-04-24 RX ADMIN — SODIUM CHLORIDE 125 ML/HR: 0.9 INJECTION, SOLUTION INTRAVENOUS at 14:31

## 2019-04-24 RX ADMIN — PROPOFOL 50 MG: 10 INJECTION, EMULSION INTRAVENOUS at 14:42

## 2019-04-24 RX ADMIN — PROPOFOL 200 MG: 10 INJECTION, EMULSION INTRAVENOUS at 14:39

## 2019-04-24 RX ADMIN — PROPOFOL 100 MG: 10 INJECTION, EMULSION INTRAVENOUS at 14:40

## 2019-04-24 RX ADMIN — PROPOFOL 50 MG: 10 INJECTION, EMULSION INTRAVENOUS at 14:45

## 2019-04-24 RX ADMIN — PROPOFOL 50 MG: 10 INJECTION, EMULSION INTRAVENOUS at 14:44

## 2019-04-24 RX ADMIN — LIDOCAINE HYDROCHLORIDE 5 ML: 20 INJECTION, SOLUTION EPIDURAL; INFILTRATION; INTRACAUDAL; PERINEURAL at 14:39

## 2019-04-24 RX ADMIN — PROPOFOL 50 MG: 10 INJECTION, EMULSION INTRAVENOUS at 14:47

## 2019-04-29 ENCOUNTER — TELEPHONE (OUTPATIENT)
Dept: GASTROENTEROLOGY | Facility: MEDICAL CENTER | Age: 19
End: 2019-04-29

## 2019-05-11 ENCOUNTER — HOSPITAL ENCOUNTER (OUTPATIENT)
Dept: ULTRASOUND IMAGING | Facility: HOSPITAL | Age: 19
Discharge: HOME | End: 2019-05-11
Attending: INTERNAL MEDICINE
Payer: COMMERCIAL

## 2019-05-11 DIAGNOSIS — R10.10 PAIN OF UPPER ABDOMEN: ICD-10-CM

## 2019-05-11 PROCEDURE — 76705 ECHO EXAM OF ABDOMEN: CPT

## 2019-05-15 ENCOUNTER — TELEPHONE (OUTPATIENT)
Dept: GASTROENTEROLOGY | Facility: AMBULARY SURGERY CENTER | Age: 19
End: 2019-05-15

## 2019-05-15 DIAGNOSIS — R10.9 ABDOMINAL PAIN, UNSPECIFIED ABDOMINAL LOCATION: Primary | ICD-10-CM

## 2019-05-22 ENCOUNTER — TELEPHONE (OUTPATIENT)
Dept: GASTROENTEROLOGY | Facility: MEDICAL CENTER | Age: 19
End: 2019-05-22

## 2019-05-23 ENCOUNTER — HOSPITAL ENCOUNTER (OUTPATIENT)
Dept: NUCLEAR MEDICINE | Facility: HOSPITAL | Age: 19
Discharge: HOME | End: 2019-05-23
Payer: COMMERCIAL

## 2019-05-23 DIAGNOSIS — R10.9 ABDOMINAL PAIN, UNSPECIFIED ABDOMINAL LOCATION: ICD-10-CM

## 2019-05-23 PROCEDURE — A9537 TC99M MEBROFENIN: HCPCS

## 2019-05-23 PROCEDURE — 78227 HEPATOBIL SYST IMAGE W/DRUG: CPT

## 2019-05-23 RX ADMIN — SINCALIDE 1.8 MCG: 5 INJECTION, POWDER, LYOPHILIZED, FOR SOLUTION INTRAVENOUS at 09:00

## 2019-07-16 ENCOUNTER — OFFICE VISIT (OUTPATIENT)
Dept: GASTROENTEROLOGY | Facility: MEDICAL CENTER | Age: 19
End: 2019-07-16
Payer: COMMERCIAL

## 2019-07-16 VITALS
SYSTOLIC BLOOD PRESSURE: 120 MMHG | DIASTOLIC BLOOD PRESSURE: 70 MMHG | BODY MASS INDEX: 26.51 KG/M2 | HEIGHT: 73 IN | HEART RATE: 76 BPM | TEMPERATURE: 97.8 F | WEIGHT: 200 LBS

## 2019-07-16 DIAGNOSIS — K21.00 GASTROESOPHAGEAL REFLUX DISEASE WITH ESOPHAGITIS: Primary | ICD-10-CM

## 2019-07-16 PROBLEM — K21.9 GERD (GASTROESOPHAGEAL REFLUX DISEASE): Status: ACTIVE | Noted: 2019-07-16

## 2019-07-16 PROCEDURE — 99213 OFFICE O/P EST LOW 20 MIN: CPT | Performed by: INTERNAL MEDICINE

## 2019-07-16 RX ORDER — OMEPRAZOLE 20 MG/1
20 CAPSULE, DELAYED RELEASE ORAL DAILY
Qty: 30 CAPSULE | Refills: 2 | Status: SHIPPED | OUTPATIENT
Start: 2019-07-16 | End: 2019-11-08

## 2019-07-16 NOTE — PATIENT INSTRUCTIONS
1  Omeprazole 20 mg daily  2  If the omeprazole is not working or makes you feel foggy then will try Prevacid 15 mg (OTC) or 30 mg    3  Eventually after next month will taper to every other day for 2 weeks then stop  4  Control diet (keep food diary)  5  If symptoms occur once in a while may take Zantac 150 mg as needed (over the counter)  6  If symptoms occurring more than 2 - 3 / week then go back on omeprazole or Prevacid and let us know  7  If symptoms persist then will consider STRETTA or surgery for reflux but will need pH and manometry (esophageal pressure) study before that  Gastroesophageal Reflux Disease   AMBULATORY CARE:   Gastroesophageal reflux  reflux occurs when acid and food in the stomach back up into the esophagus  Gastroesophageal reflux disease (GERD) is reflux that occurs more than twice a week for a few weeks  It usually causes heartburn and other symptoms  GERD can cause other health problems over time if it is not treated  Common symptoms include:  Heartburn is the most common symptom of GERD  You may feel burning pain in your chest or below the breast bone  This usually occurs after meals and spreads to your neck, jaw, or shoulder  The pain gets better when you change positions  You may also have any of the following:  · Bitter or acid taste in your mouth    · Dry cough    · Trouble swallowing or pain with swallowing    · Hoarseness or sore throat    · Frequent burping or hiccups    · Feeling of fullness soon after you start eating  Seek care immediately if:  · You feel full and cannot burp or vomit  · You have severe chest pain and sudden trouble breathing  · Your bowel movements are black, bloody, or tarry-looking  · Your vomit looks like coffee grounds or has blood in it  Contact your healthcare provider if:   · You vomit large amounts, or you vomit often  · You have trouble breathing after you vomit       · You have trouble swallowing, or pain with swallowing  · You are losing weight without trying  · Your symptoms get worse or do not improve with treatment  · You have questions or concerns about your condition or care  Treatment for GERD:  Your healthcare provider may prescribe medicine to decrease stomach acid  He may also prescribe medicine that help your esophagus and stomach move food and liquid to your intestines  Surgery may be done if other treatments do not work  You may need surgery to wrap the upper part of the stomach around the esophageal sphincter  This will strengthen the sphincter and prevent reflux  Manage GERD:   · Do not have foods or drinks that may increase heartburn  These include chocolate, peppermint, fried or fatty foods, drinks that contain caffeine, or carbonated drinks (soda)  Other foods include spicy foods, onions, tomatoes, and tomato-based foods  Do not have foods or drinks that can irritate your esophagus, such as citrus fruits, juices, and alcohol  · Do not eat large meals  When you eat a lot of food at one time, your stomach needs more acid to digest it  Eat 6 small meals each day instead of 3 large ones, and eat slowly  Do not eat meals 2 to 3 hours before bedtime  · Elevate the head of your bed  Place 6-inch blocks under the head of your bed frame  You may also use more than one pillow under your head and shoulders while you sleep  · Maintain a healthy weight  If you are overweight, weight loss may help relieve symptoms of GERD  · Do not smoke  Smoking weakens the lower esophageal sphincter and increases the risk of GERD  Ask your healthcare provider for information if you currently smoke and need help to quit  E-cigarettes or smokeless tobacco still contain nicotine  Talk to your healthcare provider before you use these products  · Do not wear clothing that is tight around your waist   Tight clothing can put pressure on your stomach and cause or worsen GERD symptoms    Follow up with your healthcare provider as directed:  Write down your questions so you remember to ask them during your visits  © 2017 [address] [employer] Information is for End User's use only and may not be sold, redistributed or otherwise used for commercial purposes  All illustrations and images included in CareNotes® are the copyrighted property of [employer]  or [employer]  The above information is an  only  It is not intended as medical advice for individual conditions or treatments  Talk to your doctor, nurse or pharmacist before following any medical regimen to see if it is safe and effective for you

## 2019-07-16 NOTE — PROGRESS NOTES
Outpatient Follow up  [facility] [address]  [address] [facility]  Lauren Cardoza MD  Ph : [phone]  Fax : [phone]  Mobile : [phone]  Email : [email or web address]  Also available on [employer]    Rajan Garay 20 y o  male MRN: [id number]    PCP: Linda Benites MD  Referring: No referring provider defined for this encounter  Rajan Garay presented for a follow up visit  My recommendations are included  Please do not hesitate to contact me with any questions you may have  ASSESSMENT AND PLAN:      GERD (gastroesophageal reflux disease)  At this time I would recommend the following :   1  Omeprazole 20 mg daily  2  If the omeprazole is not working or makes you feel foggy then will try Prevacid 15 mg (OTC) or 30 mg    3  Eventually after next month will taper to every other day for 2 weeks then stop  4  Control diet (keep food diary)  5  If symptoms occur once in a while may take Zantac 150 mg as needed (over the counter)  6  If symptoms occurring more than 2 - 3 / week then go back on omeprazole or Prevacid and let us know  7  If symptoms persist then will consider STRETTA or surgery for reflux but will need pH and manometry (esophageal pressure) study before that  Diagnoses and all orders for this visit:    Gastroesophageal reflux disease with esophagitis  -     omeprazole (PriLOSEC) 20 mg delayed release capsule; Take 1 capsule (20 mg total) by mouth daily for 30 days      ______________________________________________________________________    SUBJECTIVE:  Casimiro presents for follow up  He had an EGD done for epigastric pain and had mild esophagitis with biopsies showing 5 eosinophils per high power field  Random esophageal biopsies were negative for EoE  His gastric and duodenal biopsies were negative  He is on omeprazole 20 mg daily and is doing better  No pain now but does have it every often   Once every week or every other week may have pain in the lower abdomen  Since he started with omeprazole it appears that he can not focus  His appetite is well  He is not losing weight  REVIEW OF SYSTEMS IS OTHERWISE NEGATIVE  Historical Information   History reviewed  No pertinent past medical history  Past Surgical History:   Procedure Laterality Date    EYE SURGERY      HERNIA REPAIR      OR ESOPHAGOGASTRODUODENOSCOPY TRANSORAL DIAGNOSTIC N/A 4/24/2019    Procedure: ESOPHAGOGASTRODUODENOSCOPY (EGD); Surgeon: Alexandre Gallegos MD;  Location: [facility] GI LAB;   Service: Gastroenterology    WISDOM TOOTH EXTRACTION       Social History   Social History     Substance and Sexual Activity   Alcohol Use No     Social History     Substance and Sexual Activity   Drug Use No     Social History     Tobacco Use   Smoking Status Never Smoker   Smokeless Tobacco Never Used     Family History   Problem Relation Age of Onset    No Known Problems Mother     Hypertension Father     Depression Father     No Known Problems Brother     COPD Paternal Grandfather     Hypertension Paternal Grandfather     Arthritis Family     Cancer Family     Osteoporosis Family     No Known Problems Sister     No Known Problems Maternal Aunt     No Known Problems Maternal Uncle     No Known Problems Paternal Aunt     No Known Problems Paternal Uncle     No Known Problems Maternal Grandmother     No Known Problems Maternal Grandfather     No Known Problems Paternal Grandmother     ADD / ADHD Neg Hx     Anesthesia problems Neg Hx     Clotting disorder Neg Hx     Collagen disease Neg Hx     Diabetes Neg Hx     Dislocations Neg Hx     Learning disabilities Neg Hx     Neurological problems Neg Hx     Rheumatologic disease Neg Hx     Scoliosis Neg Hx     Vascular Disease Neg Hx        Meds/Allergies       Current Outpatient Medications:     omeprazole (PriLOSEC) 20 mg delayed release capsule    Allergies   Allergen Reactions    Cefzil [Cefprozil] Hives           Objective     Blood pressure 120/70, pulse 76, temperature 97 8 °F (36 6 °C), temperature source Tympanic, height 6' 1" (1 854 m), weight 90 7 kg (200 lb)  Body mass index is 26 39 kg/m²  PHYSICAL EXAM:      Physical Exam   Constitutional: He is oriented to person, place, and time  Vital signs are normal  He appears well-developed and well-nourished  HENT:   Head: Normocephalic and atraumatic  Eyes: Pupils are equal, round, and reactive to light  Conjunctivae are normal  No scleral icterus  Neck: Normal range of motion  Cardiovascular: Normal rate, regular rhythm and normal heart sounds  Pulmonary/Chest: Effort normal and breath sounds normal  No respiratory distress  Abdominal: Soft  Normal appearance and bowel sounds are normal  He exhibits no distension, no ascites and no mass  There is no hepatosplenomegaly  There is no tenderness  No hernia  Musculoskeletal: Normal range of motion  Lymphadenopathy:     He has no cervical adenopathy  Neurological: He is alert and oriented to person, place, and time  Skin: Skin is warm  Psychiatric: He has a normal mood and affect  His behavior is normal  Thought content normal        Lab Results:   No visits with results within 1 Day(s) from this visit     Latest known visit with results is:   Admission on 04/24/2019, Discharged on 04/24/2019   Component Date Value    Case Report 04/24/2019                      Value:Surgical Pathology Report                         Case: [id number]                                   Authorizing Provider:  Jamshid Witt MD             Collected:           04/24/2019 1449              Ordering Location:     [facility]        Received:            04/24/2019 [address]  [facility]  Endoscopy                                                     Pathologist:           Georgie Wilkes MD                                                   Specimens: A) - Duodenum, duodenum biopsy r/o celiac                                                           B) - Stomach, Gastric biopsy antrum and body r/o h pylori                                           C) - Esophagus, random esophagus r/o EOE                                                   Final Diagnosis 04/24/2019                      Value: This result contains rich text formatting which cannot be displayed here   Additional Information 04/24/2019                      Value: This result contains rich text formatting which cannot be displayed here  Niharika Ureña Gross Description 04/24/2019                      Value: This result contains rich text formatting which cannot be displayed here  Radiology Results:   No results found

## 2019-07-22 NOTE — ASSESSMENT & PLAN NOTE
At this time I would recommend the following :   1  Omeprazole 20 mg daily  2  If the omeprazole is not working or makes you feel foggy then will try Prevacid 15 mg (OTC) or 30 mg    3  Eventually after next month will taper to every other day for 2 weeks then stop  4  Control diet (keep food diary)  5  If symptoms occur once in a while may take Zantac 150 mg as needed (over the counter)  6  If symptoms occurring more than 2 - 3 / week then go back on omeprazole or Prevacid and let us know  7  If symptoms persist then will consider STRETTA or surgery for reflux but will need pH and manometry (esophageal pressure) study before that

## 2019-11-08 ENCOUNTER — EVALUATION (OUTPATIENT)
Dept: PHYSICAL THERAPY | Facility: CLINIC | Age: 19
End: 2019-11-08
Payer: COMMERCIAL

## 2019-11-08 ENCOUNTER — OFFICE VISIT (OUTPATIENT)
Dept: FAMILY MEDICINE CLINIC | Facility: CLINIC | Age: 19
End: 2019-11-08
Payer: COMMERCIAL

## 2019-11-08 VITALS
WEIGHT: 209 LBS | HEART RATE: 78 BPM | DIASTOLIC BLOOD PRESSURE: 80 MMHG | HEIGHT: 73 IN | OXYGEN SATURATION: 98 % | BODY MASS INDEX: 27.7 KG/M2 | SYSTOLIC BLOOD PRESSURE: 124 MMHG

## 2019-11-08 DIAGNOSIS — M25.561 CHRONIC PAIN OF BOTH KNEES: Primary | ICD-10-CM

## 2019-11-08 DIAGNOSIS — G89.29 CHRONIC PAIN OF BOTH KNEES: Primary | ICD-10-CM

## 2019-11-08 DIAGNOSIS — M25.562 CHRONIC PAIN OF BOTH KNEES: Primary | ICD-10-CM

## 2019-11-08 DIAGNOSIS — M22.2X9 DISORDER OF PATELLOFEMORAL JOINT, UNSPECIFIED LATERALITY: ICD-10-CM

## 2019-11-08 PROCEDURE — 97110 THERAPEUTIC EXERCISES: CPT | Performed by: PHYSICAL THERAPIST

## 2019-11-08 PROCEDURE — 99213 OFFICE O/P EST LOW 20 MIN: CPT | Performed by: FAMILY MEDICINE

## 2019-11-08 PROCEDURE — 97162 PT EVAL MOD COMPLEX 30 MIN: CPT | Performed by: PHYSICAL THERAPIST

## 2019-11-08 NOTE — PROGRESS NOTES
Subjective:      Patient ID: Obed Donald is a 20 y o  male  Knee Pain    Incident onset: 3 weeks  There was no injury mechanism  The pain is present in the right knee and left knee  The quality of the pain is described as aching  The pain is at a severity of 4/10  The pain is moderate  The pain has been fluctuating since onset  Pertinent negatives include no inability to bear weight, loss of motion, loss of sensation, muscle weakness, numbness or tingling  The symptoms are aggravated by movement, palpation and weight bearing  He has tried immobilization and NSAIDs for the symptoms  The treatment provided mild relief  No past medical history on file  Family History   Problem Relation Age of Onset    No Known Problems Mother     Hypertension Father     Depression Father     No Known Problems Brother     COPD Paternal Grandfather     Hypertension Paternal Grandfather     Arthritis Family     Cancer Family     Osteoporosis Family     No Known Problems Sister     No Known Problems Maternal Aunt     No Known Problems Maternal Uncle     No Known Problems Paternal Aunt     No Known Problems Paternal Uncle     No Known Problems Maternal Grandmother     No Known Problems Maternal Grandfather     No Known Problems Paternal Grandmother     ADD / ADHD Neg Hx     Anesthesia problems Neg Hx     Clotting disorder Neg Hx     Collagen disease Neg Hx     Diabetes Neg Hx     Dislocations Neg Hx     Learning disabilities Neg Hx     Neurological problems Neg Hx     Rheumatologic disease Neg Hx     Scoliosis Neg Hx     Vascular Disease Neg Hx        Past Surgical History:   Procedure Laterality Date    EYE SURGERY      HERNIA REPAIR      [place] ESOPHAGOGASTRODUODENOSCOPY TRANSORAL DIAGNOSTIC N/A 4/24/2019    Procedure: ESOPHAGOGASTRODUODENOSCOPY (EGD); Surgeon: Dara Jovel MD;  Location: [facility] GI LAB;   Service: Gastroenterology    WISDOM TOOTH EXTRACTION          reports that he has never smoked  He has never used smokeless tobacco  He reports that he does not drink alcohol or use drugs  No current outpatient medications on file  The following portions of the patient's history were reviewed and updated as appropriate: allergies, current medications, past family history, past medical history, past social history, past surgical history and problem list     Review of Systems   Constitutional: Negative  Respiratory: Negative  Cardiovascular: Negative  Gastrointestinal: Negative  Musculoskeletal: Negative for myalgias  Knee pain   Neurological: Negative  Negative for tingling and numbness  Psychiatric/Behavioral: Negative  Objective:    /80 (BP Location: Left arm, Patient Position: Sitting, Cuff Size: Large)   Pulse 78   Ht 6' 1" (1 854 m)   Wt 94 8 kg (209 lb)   SpO2 98%   BMI 27 57 kg/m²      Physical Exam   Constitutional: He is oriented to person, place, and time  He appears well-developed and well-nourished  Cardiovascular: Normal rate, regular rhythm and normal heart sounds  Pulmonary/Chest: Effort normal and breath sounds normal    Abdominal: Soft  Bowel sounds are normal    Musculoskeletal: He exhibits tenderness (Mild tenderness over inferolateral aspect of both knees  No joint effusion/ no joint line tenderness)  Neurological: He is alert and oriented to person, place, and time  Psychiatric: His behavior is normal  Judgment normal          No results found for this or any previous visit (from the past 1008 hour(s))  Assessment/Plan:       Diagnoses and all orders for this visit:    Chronic pain of both knees  -     Ambulatory referral to Physical Therapy; Future    Disorder of patellofemoral joint, unspecified laterality  -     Ambulatory referral to Physical Therapy; Future      patient advised conservative care including intermittent use of over-the-counter NSAIDs, physical therapy and a knee sleeve    Follow up if symptoms persist or worsen

## 2019-11-08 NOTE — PROGRESS NOTES
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 and would like HEP only  Patient provided with Home Exercise Program, all questions answered, and verbalized understanding, agreeing to plan of care  Thus it was mutually decided to discontinue this episode of care and transition to Home Exercise Program     Subjective Evaluation    History of Present Illness  Mechanism of injury: WORK/SCHOOL: [employer]  HOME LIFE: Patient lives off campus in an apartment, 12 steps within apartment  HOBBIES/EXERCISE: 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  PLOF: No hx of knee pain, but has had hip pain for several years from playing soccer, he has occasional back pain with strength sports  HISTORY OF CURRENT INJURY:  3 weeks ago, he had pain when he deadlifted  He sat for a few days, then tried again and had knee pain, and now both knees hurt when he squats  He has lingering hip pain all of the time as well  PAIN LOCATION/DESCRIPTORS: Pain is located in patellar tendon, and on the lateral patella  He feels tingles and pain there  It can be L, R, or both depending on the day  AGGRAVATING FACTORS:  Squatting, going up steps, walking, driving, sit to stand  EASES: Advil, pistol squats? DAY PATTERN: Pain is worst in the am, right when he wakes up   He feels it for at least 20-30 minutes  IMAGING:  None  SPECIAL QUESTIONS:    June Melendrez denies a new onset of Bladder incontinence, Bowel dysfunction, Recent unexplained weight loss, Clumsy or unsteadiness, Constant night pain and History of cancer  PATIENT GOALS: Patient wishes to get exercise to make his knees not hurt  Pain  Current pain rating: 3  At best pain ratin  At worst pain ratin  Quality: sharp and dull ache    Patient Goals  Patient goals for therapy: decreased pain, increased strength and return to sport/leisure activities    Patient will demonstrate independence with HEP and discharge  - met          Objective     General Comments:      Knee Comments  Gait:pain during heel strike, bilateral trendelenberg, too many toes bilaterally  Squat: wide WYATT, pain in bilateral patellar tendons  Positive step up test bilaterally  Strength globally 4+/5 except hip abd 4-/5 bilaterally  Palpation: tenderness bilateral medial joint lines and patellar tendons    Positive patellar compressions tests bilaterally               Precautions: None      Manual              None                                                                     Exercise Diary              Clamshells X 10 green TB            Bridges with hip abd X 10 green TB            X band walks  X 1 lap green band            SL Deadlifts X 5 ea leg                                                                                                                                                                                                                                Modalities              None

## 2019-11-26 ENCOUNTER — OFFICE VISIT (OUTPATIENT)
Dept: FAMILY MEDICINE CLINIC | Facility: CLINIC | Age: 19
End: 2019-11-26
Payer: COMMERCIAL

## 2019-11-26 VITALS
RESPIRATION RATE: 18 BRPM | BODY MASS INDEX: 27.83 KG/M2 | SYSTOLIC BLOOD PRESSURE: 124 MMHG | TEMPERATURE: 101.7 F | HEIGHT: 73 IN | DIASTOLIC BLOOD PRESSURE: 64 MMHG | OXYGEN SATURATION: 98 % | WEIGHT: 210 LBS | HEART RATE: 83 BPM

## 2019-11-26 DIAGNOSIS — J06.9 VIRAL UPPER RESPIRATORY TRACT INFECTION: Primary | ICD-10-CM

## 2019-11-26 PROCEDURE — 99213 OFFICE O/P EST LOW 20 MIN: CPT | Performed by: FAMILY MEDICINE

## 2019-11-26 PROCEDURE — 1036F TOBACCO NON-USER: CPT | Performed by: FAMILY MEDICINE

## 2019-11-26 RX ORDER — OSELTAMIVIR PHOSPHATE 75 MG/1
75 CAPSULE ORAL 2 TIMES DAILY
Qty: 10 CAPSULE | Refills: 0 | Status: SHIPPED | OUTPATIENT
Start: 2019-11-26 | End: 2019-12-01

## 2022-08-10 ENCOUNTER — APPOINTMENT (OUTPATIENT)
Dept: LAB | Facility: CLINIC | Age: 22
End: 2022-08-10

## 2022-08-10 DIAGNOSIS — Z02.1 PRE-EMPLOYMENT EXAMINATION: ICD-10-CM

## 2022-08-10 PROCEDURE — 86787 VARICELLA-ZOSTER ANTIBODY: CPT

## 2022-08-10 PROCEDURE — 36415 COLL VENOUS BLD VENIPUNCTURE: CPT

## 2022-08-10 PROCEDURE — 86765 RUBEOLA ANTIBODY: CPT

## 2022-08-10 PROCEDURE — 86480 TB TEST CELL IMMUN MEASURE: CPT

## 2022-08-10 PROCEDURE — 86762 RUBELLA ANTIBODY: CPT

## 2022-08-10 PROCEDURE — 86735 MUMPS ANTIBODY: CPT

## 2022-08-11 LAB
MEV IGG SER QL IA: NORMAL
MUV IGG SER QL IA: NORMAL
RUBV IGG SERPL IA-ACNC: 8.5 IU/ML
VZV IGG SER QL IA: NORMAL

## 2022-08-12 LAB
GAMMA INTERFERON BACKGROUND BLD IA-ACNC: 0.03 IU/ML
M TB IFN-G BLD-IMP: NEGATIVE
M TB IFN-G CD4+ BCKGRND COR BLD-ACNC: 0 IU/ML
M TB IFN-G CD4+ BCKGRND COR BLD-ACNC: 0 IU/ML
MITOGEN IGNF BCKGRD COR BLD-ACNC: >10 IU/ML

## 2023-12-15 DIAGNOSIS — Z00.6 ENCOUNTER FOR EXAMINATION FOR NORMAL COMPARISON OR CONTROL IN CLINICAL RESEARCH PROGRAM: ICD-10-CM

## 2023-12-22 ENCOUNTER — APPOINTMENT (OUTPATIENT)
Dept: LAB | Facility: HOSPITAL | Age: 23
End: 2023-12-22
Attending: PATHOLOGY

## 2023-12-22 DIAGNOSIS — Z00.6 ENCOUNTER FOR EXAMINATION FOR NORMAL COMPARISON OR CONTROL IN CLINICAL RESEARCH PROGRAM: ICD-10-CM

## 2023-12-22 PROCEDURE — 36415 COLL VENOUS BLD VENIPUNCTURE: CPT

## 2024-01-23 LAB
APOB+LDLR+PCSK9 GENE MUT ANL BLD/T: NOT DETECTED
BRCA1+BRCA2 DEL+DUP + FULL MUT ANL BLD/T: NOT DETECTED
MLH1+MSH2+MSH6+PMS2 GN DEL+DUP+FUL M: NOT DETECTED

## 2024-02-21 PROBLEM — J06.9 VIRAL UPPER RESPIRATORY TRACT INFECTION: Status: RESOLVED | Noted: 2019-11-26 | Resolved: 2024-02-21

## 2024-07-26 ENCOUNTER — OFFICE VISIT (OUTPATIENT)
Dept: FAMILY MEDICINE CLINIC | Facility: CLINIC | Age: 24
End: 2024-07-26
Payer: COMMERCIAL

## 2024-07-26 VITALS
WEIGHT: 251 LBS | SYSTOLIC BLOOD PRESSURE: 138 MMHG | HEART RATE: 94 BPM | TEMPERATURE: 97.7 F | OXYGEN SATURATION: 97 % | HEIGHT: 73 IN | BODY MASS INDEX: 33.27 KG/M2 | DIASTOLIC BLOOD PRESSURE: 68 MMHG

## 2024-07-26 DIAGNOSIS — F41.1 GENERALIZED ANXIETY DISORDER: ICD-10-CM

## 2024-07-26 DIAGNOSIS — Z00.00 ANNUAL PHYSICAL EXAM: Primary | ICD-10-CM

## 2024-07-26 PROCEDURE — 99213 OFFICE O/P EST LOW 20 MIN: CPT

## 2024-07-26 PROCEDURE — 99385 PREV VISIT NEW AGE 18-39: CPT

## 2024-07-26 RX ORDER — BUPROPION HYDROCHLORIDE 150 MG/1
150 TABLET ORAL EVERY MORNING
Qty: 30 TABLET | Refills: 1 | Status: SHIPPED | OUTPATIENT
Start: 2024-07-26 | End: 2024-09-24

## 2024-07-26 NOTE — PROGRESS NOTES
Adult Annual Physical  Name: Chad Cornejo      : 2000      MRN: [id number]  Encounter Provider: AYAN Holguin  Encounter Date: 2024   Encounter department: [facility]    Assessment & Plan   1. Annual physical exam  Assessment & Plan:  CPE done, routine labs ordered. Continue healthy eating habits and regular exercise.  Orders:  -     CBC and differential  2. Generalized anxiety disorder  Assessment & Plan:  JACKELIN-7 score 12 discussed medication and CBT. Pt agreeable to Bupropion  mg potential medication side effects discussed referral to psych and will re-eval in 4 weeks.  Orders:  -     Ambulatory referral to Psych Services; Future  -     buPROPion (WELLBUTRIN XL) 150 mg 24 hr tablet; Take 1 tablet (150 mg total) by mouth every morning  -     Comprehensive metabolic panel    Immunizations and preventive care screenings were discussed with patient today. Appropriate education was printed on patient's after visit summary.    Counseling:  Sexual health: discussed sexually transmitted diseases, partner selection, use of condoms, avoidance of unintended pregnancy, and contraceptive alternatives.  Exercise: the importance of regular exercise/physical activity was discussed. Recommend exercise 3-5 times per week for at least 30 minutes.       Depression Screening and Follow-up Plan: Patient was screened for depression during today's encounter. They screened negative with a PHQ-2 score of 0.        History of Present Illness     Adult Annual Physical:  Patient presents for annual physical. Establish Care and anxiety- JACKELIN-7 score 12 pt would like to start medication and therapy..     Diet and Physical Activity:  - Diet/Nutrition: well balanced diet and limited junk food.  - Exercise: 5-7 times a week on average and moderate cardiovascular exercise.    Depression Screening:  - PHQ-2 Score: 0    General Health:  - Sleep: sleeps well and 4-6 hours of sleep on  average.  - Hearing: normal hearing bilateral ears.  - Vision: goes for regular eye exams and wears glasses.  - Dental: no dental visits for > 1 year and brushes teeth twice daily.     Health:  - History of STDs: no.   - Urinary symptoms: none.     Review of Systems   Constitutional:  Negative for activity change, appetite change, chills, diaphoresis, fatigue, fever and unexpected weight change.   HENT:  Negative for congestion, dental problem, drooling, ear discharge, ear pain, facial swelling, hearing loss, mouth sores, nosebleeds, postnasal drip, rhinorrhea, sinus pressure, sinus pain, sneezing, sore throat, tinnitus, trouble swallowing and voice change.    Eyes:  Negative for photophobia, pain, discharge, redness, itching and visual disturbance.   Respiratory:  Negative for apnea, cough, choking, chest tightness, shortness of breath, wheezing and stridor.    Cardiovascular:  Negative for chest pain, palpitations and leg swelling.   Gastrointestinal:  Negative for abdominal distention, abdominal pain, anal bleeding, blood in stool, constipation, diarrhea, nausea and vomiting.   Endocrine: Negative for cold intolerance, heat intolerance, polydipsia, polyphagia and polyuria.   Genitourinary:  Negative for decreased urine volume, difficulty urinating, dysuria, flank pain, frequency, hematuria, penile discharge, scrotal swelling, testicular pain and urgency.   Musculoskeletal:  Negative for arthralgias, back pain, gait problem, joint swelling, myalgias, neck pain and neck stiffness.   Skin:  Negative for color change, rash and wound.   Allergic/Immunologic: Negative for environmental allergies, food allergies and immunocompromised state.   Neurological:  Negative for dizziness, tremors, seizures, syncope, facial asymmetry, speech difficulty, weakness, light-headedness, numbness and headaches.   Hematological:  Negative for adenopathy. Does not bruise/bleed easily.   Psychiatric/Behavioral:  Negative for agitation,  "behavioral problems, confusion, decreased concentration, dysphoric mood, hallucinations, self-injury, sleep disturbance and suicidal ideas. The patient is not nervous/anxious and is not hyperactive.    All other systems reviewed and are negative.    No current outpatient medications on file prior to visit.     No current facility-administered medications on file prior to visit.      Social History     Tobacco Use    Smoking status: Never    Smokeless tobacco: Never   Vaping Use    Vaping status: Never Used   Substance and Sexual Activity    Alcohol use: No    Drug use: No    Sexual activity: Yes     Partners: Female       Objective     /68 (BP Location: Right arm, Patient Position: Sitting)   Pulse 94   Temp 97.7 °F (36.5 °C) (Tympanic)   Ht 6' 1\" (1.854 m)   Wt 114 kg (251 lb)   SpO2 97%   BMI 33.12 kg/m²     Physical Exam  Vitals and nursing note reviewed.   Constitutional:       General: He is not in acute distress.     Appearance: Normal appearance. He is obese. He is not ill-appearing, toxic-appearing or diaphoretic.   HENT:      Head: Normocephalic and atraumatic.      Right Ear: Tympanic membrane, ear canal and external ear normal. There is no impacted cerumen.      Left Ear: Tympanic membrane, ear canal and external ear normal. There is no impacted cerumen.      Nose: Nose normal. No congestion or rhinorrhea.      Mouth/Throat:      Mouth: Mucous membranes are moist.      Pharynx: Oropharynx is clear. No oropharyngeal exudate or posterior oropharyngeal erythema.   Eyes:      General: No scleral icterus.        Right eye: No discharge.         Left eye: No discharge.      Extraocular Movements: Extraocular movements intact.      Conjunctiva/sclera: Conjunctivae normal.      Pupils: Pupils are equal, round, and reactive to light.   Neck:      Vascular: No carotid bruit.   Cardiovascular:      Rate and Rhythm: Normal rate and regular rhythm.      Pulses: Normal pulses.      Heart sounds: Normal " heart sounds. No murmur heard.     No friction rub. No gallop.   Pulmonary:      Effort: Pulmonary effort is normal. No respiratory distress.      Breath sounds: Normal breath sounds. No stridor. No wheezing, rhonchi or rales.   Chest:      Chest wall: No tenderness.   Abdominal:      General: Bowel sounds are normal. There is no distension.      Palpations: Abdomen is soft. There is no mass.      Tenderness: There is no abdominal tenderness. There is no right CVA tenderness, left CVA tenderness, guarding or rebound.      Hernia: No hernia is present.   Genitourinary:     Testes: Normal.   Musculoskeletal:         General: No swelling, tenderness, deformity or signs of injury. Normal range of motion.      Cervical back: Normal range of motion and neck supple. No rigidity or tenderness.      Right lower leg: No edema.      Left lower leg: No edema.   Lymphadenopathy:      Cervical: No cervical adenopathy.   Skin:     General: Skin is warm.      Capillary Refill: Capillary refill takes less than 2 seconds.      Coloration: Skin is not jaundiced or pale.      Findings: No bruising, erythema, lesion or rash.   Neurological:      General: No focal deficit present.      Mental Status: He is alert and oriented to person, place, and time.      Cranial Nerves: No cranial nerve deficit.      Sensory: No sensory deficit.      Motor: No weakness.      Coordination: Coordination normal.      Gait: Gait normal.      Deep Tendon Reflexes: Reflexes normal.   Psychiatric:         Mood and Affect: Mood normal.         Behavior: Behavior normal.         Thought Content: Thought content normal.         Judgment: Judgment normal.

## 2024-07-26 NOTE — ASSESSMENT & PLAN NOTE
JACKELIN-7 score 12 discussed medication and CBT. Pt agreeable to Bupropion  mg potential medication side effects discussed referral to psych and will re-eval in 4 weeks.

## 2024-07-30 ENCOUNTER — TELEPHONE (OUTPATIENT)
Age: 24
End: 2024-07-30

## 2024-07-30 NOTE — TELEPHONE ENCOUNTER
Was calling pt in regards to routine referral and adding to proper wait list. LVM for pt to contact intake dept.

## 2024-08-02 ENCOUNTER — APPOINTMENT (OUTPATIENT)
Dept: LAB | Facility: CLINIC | Age: 24
End: 2024-08-02
Payer: COMMERCIAL

## 2024-08-02 DIAGNOSIS — Z00.8 OTHER SPECIFIED GENERAL MEDICAL EXAMINATION: ICD-10-CM

## 2024-08-02 LAB
ALBUMIN SERPL BCG-MCNC: 4.7 G/DL (ref 3.5–5)
ALP SERPL-CCNC: 56 U/L (ref 34–104)
ALT SERPL W P-5'-P-CCNC: 52 U/L (ref 7–52)
ANION GAP SERPL CALCULATED.3IONS-SCNC: 6 MMOL/L (ref 4–13)
AST SERPL W P-5'-P-CCNC: 35 U/L (ref 13–39)
BASOPHILS # BLD AUTO: 0.04 THOUSANDS/ÂΜL (ref 0–0.1)
BASOPHILS NFR BLD AUTO: 1 % (ref 0–1)
BILIRUB SERPL-MCNC: 0.55 MG/DL (ref 0.2–1)
BUN SERPL-MCNC: 17 MG/DL (ref 5–25)
CALCIUM SERPL-MCNC: 10.1 MG/DL (ref 8.4–10.2)
CHLORIDE SERPL-SCNC: 105 MMOL/L (ref 96–108)
CHOLEST SERPL-MCNC: 238 MG/DL
CO2 SERPL-SCNC: 30 MMOL/L (ref 21–32)
CREAT SERPL-MCNC: 1.09 MG/DL (ref 0.6–1.3)
EOSINOPHIL # BLD AUTO: 0.18 THOUSAND/ÂΜL (ref 0–0.61)
EOSINOPHIL NFR BLD AUTO: 4 % (ref 0–6)
ERYTHROCYTE [DISTWIDTH] IN BLOOD BY AUTOMATED COUNT: 12.2 % (ref 11.6–15.1)
EST. AVERAGE GLUCOSE BLD GHB EST-MCNC: 108 MG/DL
GFR SERPL CREATININE-BSD FRML MDRD: 93 ML/MIN/1.73SQ M
GLUCOSE P FAST SERPL-MCNC: 101 MG/DL (ref 65–99)
HBA1C MFR BLD: 5.4 %
HCT VFR BLD AUTO: 48.2 % (ref 36.5–49.3)
HDLC SERPL-MCNC: 68 MG/DL
HGB BLD-MCNC: 16 G/DL (ref 12–17)
IMM GRANULOCYTES # BLD AUTO: 0.01 THOUSAND/UL (ref 0–0.2)
IMM GRANULOCYTES NFR BLD AUTO: 0 % (ref 0–2)
LDLC SERPL CALC-MCNC: 144 MG/DL (ref 0–100)
LYMPHOCYTES # BLD AUTO: 2.11 THOUSANDS/ÂΜL (ref 0.6–4.47)
LYMPHOCYTES NFR BLD AUTO: 42 % (ref 14–44)
MCH RBC QN AUTO: 29 PG (ref 26.8–34.3)
MCHC RBC AUTO-ENTMCNC: 33.2 G/DL (ref 31.4–37.4)
MCV RBC AUTO: 87 FL (ref 82–98)
MONOCYTES # BLD AUTO: 0.46 THOUSAND/ÂΜL (ref 0.17–1.22)
MONOCYTES NFR BLD AUTO: 9 % (ref 4–12)
NEUTROPHILS # BLD AUTO: 2.18 THOUSANDS/ÂΜL (ref 1.85–7.62)
NEUTS SEG NFR BLD AUTO: 44 % (ref 43–75)
NONHDLC SERPL-MCNC: 170 MG/DL
NRBC BLD AUTO-RTO: 0 /100 WBCS
PLATELET # BLD AUTO: 288 THOUSANDS/UL (ref 149–390)
PMV BLD AUTO: 10.1 FL (ref 8.9–12.7)
POTASSIUM SERPL-SCNC: 4.8 MMOL/L (ref 3.5–5.3)
PROT SERPL-MCNC: 8 G/DL (ref 6.4–8.4)
RBC # BLD AUTO: 5.52 MILLION/UL (ref 3.88–5.62)
SODIUM SERPL-SCNC: 141 MMOL/L (ref 135–147)
TRIGL SERPL-MCNC: 128 MG/DL
WBC # BLD AUTO: 4.98 THOUSAND/UL (ref 4.31–10.16)

## 2024-08-02 PROCEDURE — 83036 HEMOGLOBIN GLYCOSYLATED A1C: CPT

## 2024-08-02 PROCEDURE — 85025 COMPLETE CBC W/AUTO DIFF WBC: CPT

## 2024-08-02 PROCEDURE — 80061 LIPID PANEL: CPT

## 2024-08-02 PROCEDURE — 80053 COMPREHEN METABOLIC PANEL: CPT

## 2024-08-02 PROCEDURE — 36415 COLL VENOUS BLD VENIPUNCTURE: CPT

## 2024-08-02 NOTE — TELEPHONE ENCOUNTER
"Behavioral Health Outpatient Intake Questions    Referred By   : Referral     Please advise interviewee that they need to answer all questions truthfully to allow for best care, and any misrepresentations of information may affect their ability to be seen at this clinic   => Was this discussed? Yes     If Minor Child (under age 18)    Who is/are the legal guardian(s) of the child?     Is there a custody agreement? No     If \"YES\"- Custody orders must be obtained prior to scheduling the first appointment  In addition, Consent to Treatment must be signed by all legal guardians prior to scheduling the first appointment    If \"NO\"- Consent to Treatment must be signed by all legal guardians prior to scheduling the first appointment    Behavioral Health Outpatient Intake History -     Presenting Problem (in patient's own words):     Pt stated that he has increased anxiety     Are there any communication barriers for this patient?     No                                               If yes, please describe barriers:  NONE   If there is a unique situation, please refer to Cruz Hoskins/Shahida Evangelista for final determination.    Are you taking any psychiatric medications? Yes     If \"YES\" -What are they Wellbutrin XL     If \"YES\" -Who prescribes? PCP    Has the Patient previously received outpatient Talk Therapy or Medication Management from [facility]  No        If \"YES\"- When, Where and with Whom?         If \"NO\" -Has Patient received these services elsewhere?       If \"YES\" -When, Where, and with Whom?    Has the Patient abused alcohol or other substances in the last 6 months ? No  No concerns of substance abuse are reported.     If \"YES\" -What substance, How much, How often?     If illegal substance: Refer to [facility] (for ELAYNE) or [facility].   If Alcohol in excess of 10 drinks per week:  Refer to [facility] (for ELAYNE) or [facility]    Legal History-     Is this treatment court ordered? No   If \"yes " "\"send to :  Talk Therapy : Send to Cruz Evangelista for final determination   Med Management: Send to Dr Dickson for final determination     Has the Patient been convicted of a felony?  No   If \"Yes\" send to -When, What?  Talk Therapy: Send to Cruz Evangelista for final determination   Med Management: Send to Dr Dickson for final determination     ACCEPTED as a patient Yes  If \"Yes\" Appointment Date: 8/23/2024    Referred Elsewhere? No  If “Yes” - (Where? Ex: [facility], [facility], [facility], [facility], etc.)       Name of Insurance Co:[facility]  Insurance ID#[id number]  Insurance Phone #  If ins is primary or secondary?Primary  If patient is a minor, parents information such as Name, D.O.B of guarantor.  "

## 2024-08-06 ENCOUNTER — OFFICE VISIT (OUTPATIENT)
Dept: PODIATRY | Facility: CLINIC | Age: 24
End: 2024-08-06
Payer: COMMERCIAL

## 2024-08-06 VITALS
BODY MASS INDEX: 33.8 KG/M2 | SYSTOLIC BLOOD PRESSURE: 120 MMHG | OXYGEN SATURATION: 98 % | WEIGHT: 255 LBS | DIASTOLIC BLOOD PRESSURE: 60 MMHG | HEART RATE: 90 BPM | HEIGHT: 73 IN

## 2024-08-06 DIAGNOSIS — L60.0 INGROWING NAIL: Primary | ICD-10-CM

## 2024-08-06 PROCEDURE — 99203 OFFICE O/P NEW LOW 30 MIN: CPT | Performed by: PODIATRIST

## 2024-08-06 PROCEDURE — 11730 AVULSION NAIL PLATE SIMPLE 1: CPT | Performed by: PODIATRIST

## 2024-08-06 NOTE — PROGRESS NOTES
Assessment/Plan:     The patient's clinical examination today significant for a tender medial border of the left hallucal nail plate.  The lateral two thirds of the nail is absent status post recent trauma to the toe.  The remaining portion of the nail is causing impingement of the nailbed and associated tenderness.  There are no signs of infection noted.  Pedal pulses palpable.    The patient was agreeable to partial nail avulsion of the remaining nail plate.  After a left hallux block with 3 mL of 0.25% bupivacaine plain, the remaining portion of the nail plate was avulsed without complication.  A compressive antimicrobial dressing was applied.  Recommend daily wound care with triple biotic ointment and a Band-Aid.  There is no clinical need for antibiosis.    Recommend follow-up in 2 to 3 weeks only if symptoms fail to fully resolve by that time.     Diagnoses and all orders for this visit:    Ingrowing nail    Other orders  -     Nail removal          Subjective:     Patient ID: Chad Cornejo is a 24 y.o. male.    The patient presents today for his initial consultation with [facility] podiatry with a chief complaint of a painful remnant of a left great toe nail.  The outer portion of the nail plate was lost after recent trauma to the nail plate.  This occurred approximately 5 weeks ago.  The patient still notes tenderness associated with the nail plate in the area of the nailbed.          PAST MEDICAL HISTORY:  History reviewed. No pertinent past medical history.    PAST SURGICAL HISTORY:  Past Surgical History:   Procedure Laterality Date    EYE SURGERY      HERNIA REPAIR      WI ESOPHAGOGASTRODUODENOSCOPY TRANSORAL DIAGNOSTIC N/A 4/24/2019    Procedure: ESOPHAGOGASTRODUODENOSCOPY (EGD);  Surgeon: Diaz Evans MD;  Location: [facility];  Service: Gastroenterology    WISDOM TOOTH EXTRACTION          ALLERGIES:  Cefzil [cefprozil]    MEDICATIONS:  Current Outpatient Medications   Medication Sig Dispense  Refill    buPROPion (WELLBUTRIN XL) 150 mg 24 hr tablet Take 1 tablet (150 mg total) by mouth every morning 30 tablet 1     No current facility-administered medications for this visit.       SOCIAL HISTORY:  Social History     Socioeconomic History    Marital status: Single     Spouse name: None    Number of children: None    Years of education: None    Highest education level: None   Occupational History    None   Tobacco Use    Smoking status: Never    Smokeless tobacco: Never   Vaping Use    Vaping status: Never Used   Substance and Sexual Activity    Alcohol use: No    Drug use: No    Sexual activity: Yes     Partners: Female   Other Topics Concern    None   Social History Narrative    None     Social Determinants of Health     Financial Resource Strain: Low Risk  (5/16/2023)    [facility]    Overall Financial Resource Strain (CARDIA)     Difficulty of Paying Living Expenses: Not hard at all   Food Insecurity: No Food Insecurity (5/16/2023)    [facility]    Hunger Vital Sign     Worried About Running Out of Food in the Last Year: Never true     Ran Out of Food in the Last Year: Never true   Transportation Needs: No Transportation Needs (5/16/2023)    [facility]    PRAPARE - Transportation     Lack of Transportation (Medical): No     Lack of Transportation (Non-Medical): No   Physical Activity: Sufficiently Active (5/16/2023)    Received from [facility]    Exercise Vital Sign     Days of Exercise per Week: 7 days     Minutes of Exercise per Session: 120 min   Stress: No Stress Concern Present (5/16/2023)    [facility]    [employer] [place] [employer] - Occupational Stress Questionnaire     Feeling of Stress : Not at all   Social Connections:  Unknown (5/16/2023)    Received from [facility], [facility]    Social Connection and Isolation Panel [NHANES]     Frequency of Communication with Friends and Family: More than three times a week     Frequency of Social Gatherings with Friends and Family: More than three times a week     Attends [employer] Services: Patient declined     Active Member of Clubs or Organizations: Patient declined     Attends Club or Organization Meetings: Patient declined     Marital Status: Never    Intimate Partner Violence: Not At Risk (5/16/2023)    Received from [facility], [facility]    Humiliation, Afraid, Rape, and Kick questionnaire     Fear of Current or Ex-Partner: No     Emotionally Abused: No     Physically Abused: No     Sexually Abused: No   Housing Stability: Low Risk  (5/16/2023)    Received from [facility], [facility]    Housing Stability Vital Sign     Unable to Pay for Housing in the Last Year: No     Number of Places Lived in the Last Year: 1     Unstable Housing in the Last Year: No        Review of Systems   Constitutional: Negative.    HENT: Negative.     Eyes: Negative.    Respiratory: Negative.     Cardiovascular: Negative.    Endocrine: Negative.    Musculoskeletal: Negative.    Neurological: Negative.    Hematological: Negative.    Psychiatric/Behavioral: Negative.           Objective:     Physical Exam  Vitals reviewed.   Constitutional:       Appearance: Normal appearance.   HENT:      Head: Normocephalic and atraumatic.      Nose: Nose normal.   Eyes:      Conjunctiva/sclera: Conjunctivae normal.      Pupils: Pupils are equal, round, and reactive to light.   Pulmonary:      Effort: Pulmonary effort is normal.   Skin:     General: Skin is warm.      Capillary Refill: Capillary refill takes less than 2 seconds.   Neurological:      General: No focal deficit present.      Mental Status: He is  "alert and oriented to person, place, and time.   Psychiatric:         Mood and Affect: Mood normal.         Behavior: Behavior normal.         Thought Content: Thought content normal.         Nail removal    Date/Time: 8/6/2024 9:30 AM    Performed by: Jus Vasques DPM  Authorized by: Jus Vasques DPM    Patient location:  Clinic  Indications / Diagnosis:  Ingrown nail left great toe  Universal Protocol:  Consent: Verbal consent obtained.  Risks and benefits: risks, benefits and alternatives were discussed  Consent given by: patient  Time out: Immediately prior to procedure a \"time out\" was called to verify the correct patient, procedure, equipment, support staff and site/side marked as required.  Timeout called at: 8/6/2024 9:45 AM.  Patient understanding: patient states understanding of the procedure being performed  Patient identity confirmed: verbally with patient and provided demographic data    Location:     Foot:  L big toe  Pre-procedure details:     Skin preparation:  Alcohol    Preparation: Patient was prepped and draped in the usual sterile fashion    Anesthesia (see MAR for exact dosages):     Anesthesia method:  Nerve block    Block location:  Left hallux    Block needle gauge:  27 G    Block anesthetic:  Bupivacaine 0.25% w/o epi    Block technique:  Left hallux block    Block injection procedure:  Anatomic landmarks identified and introduced needle    Block outcome:  Anesthesia achieved  Nail Removal:     Nail removed:  Partial    Nail side:  Medial    Nail bed sutured: no    Ingrown nail:     Wedge excision of skin: no      Nail matrix removed or ablated:  None  Post-procedure details:     Dressing:  4x4 sterile gauze, antibiotic ointment and gauze roll    Patient tolerance of procedure:  Tolerated well, no immediate complications      "

## 2024-08-23 ENCOUNTER — TELEMEDICINE (OUTPATIENT)
Dept: PSYCHIATRY | Facility: CLINIC | Age: 24
End: 2024-08-23
Payer: COMMERCIAL

## 2024-08-23 DIAGNOSIS — F41.9 ANXIETY: Primary | ICD-10-CM

## 2024-08-23 DIAGNOSIS — F41.1 GENERALIZED ANXIETY DISORDER: ICD-10-CM

## 2024-08-23 PROCEDURE — 90791 PSYCH DIAGNOSTIC EVALUATION: CPT | Performed by: SOCIAL WORKER

## 2024-08-23 NOTE — PSYCH
Behavioral Health Psychotherapy Assessment    Date of Initial Psychotherapy Assessment: 08/23/24  Referral Source: self  Has a release of information been signed for the referral source? NA    Preferred Name: Chad Cornejo  Preferred Pronouns: He/him  YOB: 2000 Age: 24 y.o.  Sex assigned at birth: male   Gender Identity: male  Race:   Preferred Language: English    Emergency Contact:  Full Name: Gail Cornejo  Relationship to Client: Mother  Contact information: [phone]     Primary Care Physician:  AYAN Holguin  [address]  [address]  [facility] [address] [phone]  Has a release of information been signed? NA    Physical Health History:  Past surgical procedures: N/A  Do you have a history of any of the following: other N/A  Do you have any mobility issues? No    Relevant Family History:  Chad says his mother may have anxiety as well.     Presenting Problem (What brings you in?)    Chad Cornejo is a 24-year-old  male who presents for an initial assessment today. Chad says he wants to have therapy as a resource in case he needs it. He says he's starting school again and he will be working full-time. 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 Chad says school and work tend to be his source of anxiety and he is looking to do therapy once per month. When he's anxious, he says he worries a lot, becomes short with others, and becomes sweaty. He says he's been to therapy once in the past for anxiety. Chad is prescribed Wellbutrin and he says he takes it because he feels like he has ADHD. He says the Wellbutrin has been working, but he explains that he feels like it could be increasing his anxiety. He says he will be talking with his doctor about his symptoms next week. He says his ADHD hasn't been confirmed or denied. He expresses interest in being evaluated for ADHD. Chad reports that he wakes up a few times throughout the night  and believes it could be due to anxiety. Chad denies SI and HI.      Mental Health Advance Directive:  Do you currently have a Mental Health Advance Directive?no    Diagnosis:   Diagnosis ICD-10-CM Associated Orders   1. Anxiety  F41.9           Initial Assessment:     Current Mental Status:    Appearance: appropriate      Behavior/Manner: cooperative      Affect/Mood:  Stable    Speech:  Normal    Sleep:  Interrupted    Oriented to: oriented to self, oriented to place and oriented to time       Clinical Symptoms    Anxiety: yes      Depression Symptoms: restlessness and irritable      Anxiety Symptoms: excessive worry, muscle tension, irritable, diaphoresis, nervous/anxious and restlessness      Have you ever been assaultive to others or the environment: No      Have you ever been self-injurious: No      Counseling History:  Previous Counseling or Treatment  (Mental Health or Drug & Alcohol): Yes    Previous Counseling Details:  Chad says he went to therapy about one year ago for anxiety.   Have you previously taken psychiatric medications: No      Suicide Risk Assessment  Have you ever had a suicide attempt: No    Have you had incidents of suicidal ideation: No    Are you currently experiencing suicidal thoughts: No      Substance Abuse/Addiction Assessment:  Alcohol: No    Heroin: No    Fentanyl: No    Opiates: No    Cocaine: No    Amphetamines: No    Hallucinogens: No    Club Drugs: No    Benzodiazepines: No    Other Rx Meds: No    Marijuana: No    Tobacco/Nicotine: No    Have you experienced blackouts as a result of substance use: No    Have you had any periods of abstinence: No    Have you experienced symptoms of withdrawal: No    Have you ever overdosed on any substances?: No    Are you currently using any Medication Assisted Treatment for Substance Use: No      Compulsive Behaviors:  Compulsive Behavior Information:  N/A    Disordered Eating History:  Do you have a history of disordered eating: No       Social Determinants of Health:    SDOH:  None    Trauma and Abuse History:    Have you ever been abused: No      Legal History:    Have you ever been arrested  or had a DUI: No      Have you been incarcerated: No      Are you currently on parole/probation: No      Any current Children and Youth involvement: No      Any pending legal charges: No      Relationship History:    Current marital status: single      Natural Supports:  Mother, father, siblings and friends    Relationship History:  Chad says his relationship with his mother is functional. He says they used to have issues because he didn't communicate the best when he was younger. However, he says their relationship has been good for a while. He describes his relationship with is father in the same way.    Chad says he has an older brother that he wishes he could improve his relationship with, but he says they're in good spot overall.     Employment History    Are you currently employed: Yes      Longest period of employment:  4 years    Employer/ Job title:  [employer]    Future work goals:  Nursing    Sources of income/financial support:  Work     History:      Status: no history of  duty  Educational History:     Have you ever been diagnosed with a learning disability: No      Highest level of education:  Bachelor's Degree    School attended/attending:  [employer]    Have you ever had an IEP or 504-plan: No      Do you need assistance with reading or writing: No      Recommended Treatment:     Psychotherapy:  Individual sessions and medication management    Frequency:  1 time    Session frequency:  Monthly      Visit start and stop times:    08/23/24  Start Time: 1305  Stop Time: 1330  Total Visit Time: 25 minutes  Virtual Regular Visit    Verification of patient location:    Patient is located at Home in the following state in which I hold an active license [place]      Assessment/Plan:    Problem  List Items Addressed This Visit       Anxiety - Primary       Goals addressed in session: Treatment plan will be created during follow up session.          Reason for visit is No chief complaint on file.       Encounter provider Kamilah Mullins LCSW      Recent Visits  No visits were found meeting these conditions.  Showing recent visits within past 7 days and meeting all other requirements  Today's Visits  Date Type Provider Dept   08/23/24 Telemedicine Kamilah Mullins LCSW [facility]   Showing today's visits and meeting all other requirements  Future Appointments  No visits were found meeting these conditions.  Showing future appointments within next 150 days and meeting all other requirements       The patient was identified by name and date of birth. Chad Cornejo was informed that this is a telemedicine visit and that the visit is being conducted throughthe Epic Embedded platform. He agrees to proceed..  My office door was closed. No one else was in the room.  He acknowledged consent and understanding of privacy and security of the video platform. The patient has agreed to participate and understands they can discontinue the visit at any time.    Patient is aware this is a billable service.     Subjective  Chad Cornejo is a 24 y.o. male who presents for an initial assessment today .      HPI     No past medical history on file.    Past Surgical History:   Procedure Laterality Date    EYE SURGERY      HERNIA REPAIR      [place] ESOPHAGOGASTRODUODENOSCOPY TRANSORAL DIAGNOSTIC N/A 4/24/2019    Procedure: ESOPHAGOGASTRODUODENOSCOPY (EGD);  Surgeon: Diaz Evans MD;  Location: [facility] GI LAB;  Service: Gastroenterology    WISDOM TOOTH EXTRACTION         Current Outpatient Medications   Medication Sig Dispense Refill    buPROPion (WELLBUTRIN XL) 150 mg 24 hr tablet Take 1 tablet (150 mg total) by mouth every morning 30 tablet 1     No current facility-administered medications for this visit.         Allergies   Allergen Reactions    Cefzil [Cefprozil] Hives       Review of Systems   Psychiatric/Behavioral:  The patient is nervous/anxious.        Video Exam    There were no vitals filed for this visit.    Physical Exam  Psychiatric:         Behavior: Behavior is cooperative.        Visit Time    08/23/24  Start Time: 1305  Stop Time: 1330  Total Visit Time: 25 minutes

## 2024-08-30 ENCOUNTER — OFFICE VISIT (OUTPATIENT)
Dept: FAMILY MEDICINE CLINIC | Facility: CLINIC | Age: 24
End: 2024-08-30
Payer: COMMERCIAL

## 2024-08-30 VITALS
HEART RATE: 77 BPM | BODY MASS INDEX: 33.27 KG/M2 | DIASTOLIC BLOOD PRESSURE: 88 MMHG | OXYGEN SATURATION: 97 % | SYSTOLIC BLOOD PRESSURE: 130 MMHG | HEIGHT: 73 IN | TEMPERATURE: 97.5 F | WEIGHT: 251 LBS | RESPIRATION RATE: 13 BRPM

## 2024-08-30 DIAGNOSIS — F41.9 ANXIETY: Primary | ICD-10-CM

## 2024-08-30 DIAGNOSIS — F41.1 GENERALIZED ANXIETY DISORDER: ICD-10-CM

## 2024-08-30 DIAGNOSIS — Z11.4 SCREENING FOR HIV (HUMAN IMMUNODEFICIENCY VIRUS): ICD-10-CM

## 2024-08-30 DIAGNOSIS — Z11.3 SCREENING FOR STD (SEXUALLY TRANSMITTED DISEASE): ICD-10-CM

## 2024-08-30 PROCEDURE — 99214 OFFICE O/P EST MOD 30 MIN: CPT

## 2024-08-30 RX ORDER — BUPROPION HYDROCHLORIDE 300 MG/1
300 TABLET ORAL EVERY MORNING
Qty: 90 TABLET | Refills: 1 | Status: SHIPPED | OUTPATIENT
Start: 2024-08-30 | End: 2025-02-26

## 2024-08-30 NOTE — ASSESSMENT & PLAN NOTE
Pt reports some improvement with anxiety symptoms, continues with difficulty concentrating. Discussed increasing dose of Wellbutrin to 300 mg, pt agreeable. Pt has psychotherapy scheduled for 9/20/24.

## 2024-08-30 NOTE — PROGRESS NOTES
Ambulatory Visit  Name: Chad Cornejo      : 2000      MRN: [id number]  Encounter Provider: AYAN Holguin  Encounter Date: 2024   Encounter department: [facility] MEDICINE    Assessment & Plan   1. Anxiety  -     buPROPion (WELLBUTRIN XL) 300 mg 24 hr tablet; Take 1 tablet (300 mg total) by mouth every morning  2. Screening for HIV (human immunodeficiency virus)  -     HIV 1/2 AG/AB w Reflex [facility] for 2 yr old and above  -     Chlamydia/GC amplified DNA by PCR  -     RPR-Syphilis Screening (Total Syphilis IGG/IGM)  -     Herpes I/II IgG RIGO w Reflex to HSV-2  -     Hepatitis C antibody  3. Screening for STD (sexually transmitted disease)  Assessment & Plan:  Pt requesting STD panel, denies unprotected sexual intercourse or symptoms.  4. Generalized anxiety disorder  Assessment & Plan:  Pt reports some improvement with anxiety symptoms, continues with difficulty concentrating. Discussed increasing dose of Wellbutrin to 300 mg, pt agreeable. Pt has psychotherapy scheduled for 24.        History of Present Illness     Pt presents for f/u anxiety medication, has been taking Wellbutrin  mg qd, reports initially had increased anxiety however medication seems to be working feels concentration and agitation continues to be an issue and agreeable to medication dose increase.    Pt requesting testing for STD, denies unprotected sexual intercourse or symptoms.        Review of Systems   Constitutional:  Negative for activity change, diaphoresis and fatigue.   HENT:  Negative for congestion.    Respiratory:  Negative for cough, chest tightness, shortness of breath and wheezing.    Cardiovascular:  Negative for chest pain, palpitations and leg swelling.   Gastrointestinal:  Negative for abdominal distention.   Genitourinary:  Negative for decreased urine volume, difficulty urinating, dysuria, frequency, genital sores, hematuria, penile discharge, testicular pain and  "urgency.   Musculoskeletal:  Negative for joint swelling.   Skin:  Negative for color change, rash and wound.   Allergic/Immunologic: Negative for environmental allergies.   Neurological:  Negative for dizziness, light-headedness and headaches.   Psychiatric/Behavioral:  Positive for agitation and decreased concentration. Negative for behavioral problems, confusion, dysphoric mood, hallucinations, self-injury, sleep disturbance and suicidal ideas. The patient is nervous/anxious. The patient is not hyperactive.      Current Outpatient Medications on File Prior to Visit   Medication Sig Dispense Refill    [DISCONTINUED] buPROPion (WELLBUTRIN XL) 150 mg 24 hr tablet Take 1 tablet (150 mg total) by mouth every morning 30 tablet 1     No current facility-administered medications on file prior to visit.      Social History     Tobacco Use    Smoking status: Never    Smokeless tobacco: Never   Vaping Use    Vaping status: Never Used   Substance and Sexual Activity    Alcohol use: No    Drug use: No    Sexual activity: Yes     Partners: Female     Objective     /88 (BP Location: Left arm, Patient Position: Sitting, Cuff Size: Standard)   Pulse 77   Temp 97.5 °F (36.4 °C) (Tympanic)   Resp 13   Ht 6' 1\" (1.854 m)   Wt 114 kg (251 lb)   SpO2 97%   BMI 33.12 kg/m²     Physical Exam  Vitals and nursing note reviewed.   Constitutional:       General: He is not in acute distress.     Appearance: Normal appearance. He is not ill-appearing, toxic-appearing or diaphoretic.   HENT:      Head: Normocephalic and atraumatic.      Right Ear: Tympanic membrane, ear canal and external ear normal. There is no impacted cerumen.      Left Ear: Tympanic membrane, ear canal and external ear normal. There is no impacted cerumen.      Nose: Nose normal. No congestion or rhinorrhea.      Mouth/Throat:      Mouth: Mucous membranes are moist.      Pharynx: Oropharynx is clear. No oropharyngeal exudate or posterior oropharyngeal " erythema.   Eyes:      General: No scleral icterus.        Right eye: No discharge.         Left eye: No discharge.      Extraocular Movements: Extraocular movements intact.      Conjunctiva/sclera: Conjunctivae normal.      Pupils: Pupils are equal, round, and reactive to light.   Neck:      Vascular: No carotid bruit.   Cardiovascular:      Rate and Rhythm: Normal rate and regular rhythm.      Pulses: Normal pulses.      Heart sounds: Normal heart sounds. No murmur heard.     No friction rub. No gallop.   Pulmonary:      Effort: Pulmonary effort is normal. No respiratory distress.      Breath sounds: Normal breath sounds. No stridor. No wheezing, rhonchi or rales.   Chest:      Chest wall: No tenderness.   Abdominal:      General: Bowel sounds are normal. There is no distension.      Palpations: Abdomen is soft. There is no mass.      Tenderness: There is no abdominal tenderness. There is no right CVA tenderness, left CVA tenderness, guarding or rebound.      Hernia: No hernia is present.   Musculoskeletal:         General: No swelling, tenderness, deformity or signs of injury. Normal range of motion.      Cervical back: Normal range of motion and neck supple. No rigidity or tenderness.      Right lower leg: No edema.      Left lower leg: No edema.   Lymphadenopathy:      Cervical: No cervical adenopathy.   Skin:     General: Skin is warm.      Capillary Refill: Capillary refill takes less than 2 seconds.      Coloration: Skin is not jaundiced or pale.      Findings: No bruising, erythema, lesion or rash.   Neurological:      General: No focal deficit present.      Mental Status: He is alert and oriented to person, place, and time.      Cranial Nerves: No cranial nerve deficit.      Sensory: No sensory deficit.      Motor: No weakness.      Coordination: Coordination normal.      Gait: Gait normal.      Deep Tendon Reflexes: Reflexes normal.   Psychiatric:         Attention and Perception: Attention and perception  normal. He is attentive.         Mood and Affect: Mood and affect normal. Mood is not anxious. Affect is not angry.         Speech: Speech normal. Speech is not rapid and pressured.         Behavior: Behavior normal. Behavior is not agitated or aggressive. Behavior is cooperative.         Thought Content: Thought content normal. Thought content is not paranoid or delusional. Thought content does not include homicidal or suicidal ideation. Thought content does not include homicidal or suicidal plan.         Cognition and Memory: Cognition and memory normal.         Judgment: Judgment normal. Judgment is not impulsive or inappropriate.       Administrative Statements

## 2024-09-07 ENCOUNTER — APPOINTMENT (EMERGENCY)
Dept: RADIOLOGY | Facility: HOSPITAL | Age: 24
End: 2024-09-07
Payer: OTHER MISCELLANEOUS

## 2024-09-07 ENCOUNTER — HOSPITAL ENCOUNTER (EMERGENCY)
Facility: HOSPITAL | Age: 24
Discharge: HOME | End: 2024-09-07
Attending: EMERGENCY MEDICINE
Payer: OTHER MISCELLANEOUS

## 2024-09-07 VITALS
HEART RATE: 88 BPM | SYSTOLIC BLOOD PRESSURE: 140 MMHG | DIASTOLIC BLOOD PRESSURE: 85 MMHG | RESPIRATION RATE: 18 BRPM | OXYGEN SATURATION: 98 % | TEMPERATURE: 98.3 F

## 2024-09-07 DIAGNOSIS — S83.92XA LEFT KNEE SPRAIN: Primary | ICD-10-CM

## 2024-09-07 PROCEDURE — 73564 X-RAY EXAM KNEE 4 OR MORE: CPT

## 2024-09-07 PROCEDURE — 99284 EMERGENCY DEPT VISIT MOD MDM: CPT | Performed by: EMERGENCY MEDICINE

## 2024-09-07 PROCEDURE — 99283 EMERGENCY DEPT VISIT LOW MDM: CPT

## 2024-09-07 PROCEDURE — 96372 THER/PROPH/DIAG INJ SC/IM: CPT

## 2024-09-07 RX ORDER — KETOROLAC TROMETHAMINE 30 MG/ML
15 INJECTION, SOLUTION INTRAMUSCULAR; INTRAVENOUS ONCE
Status: COMPLETED | OUTPATIENT
Start: 2024-09-07 | End: 2024-09-07

## 2024-09-07 RX ADMIN — KETOROLAC TROMETHAMINE 15 MG: 30 INJECTION, SOLUTION INTRAMUSCULAR; INTRAVENOUS at 20:30

## 2024-09-07 NOTE — Clinical Note
Chad Cornejo was seen and treated in our emergency department on 9/7/2024.        Other - See Comments        Diagnosis:     Chad  .    He may return on this date:     May return once symptoms resolve      If you have any questions or concerns, please don't hesitate to call.      Cristhian Goncalves MD    ______________________________           _______________          _______________  Hospital Representative                              Date                                Time

## 2024-09-08 NOTE — ED PROVIDER NOTES
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     24-year-old male presenting to the ED with a complaint of left knee pain.  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  Following bumping his knee he had pain in the knee and states increased swelling.  Patient was able to bear weight at that time.  After work, the pain had worsened and the patient came in for evaluation.  Patient was his normal self prior to the injury.        Prior to Admission Medications   Prescriptions Last Dose Informant Patient Reported? Taking?   buPROPion (WELLBUTRIN XL) 300 mg 24 hr tablet   No No   Sig: Take 1 tablet (300 mg total) by mouth every morning      Facility-Administered Medications: None       History reviewed. No pertinent past medical history.    Past Surgical History:   Procedure Laterality Date    EYE SURGERY      HERNIA REPAIR      AK ESOPHAGOGASTRODUODENOSCOPY TRANSORAL DIAGNOSTIC N/A 4/24/2019    Procedure: ESOPHAGOGASTRODUODENOSCOPY (EGD);  Surgeon: Diaz Evans MD;  Location: [facility] GI LAB;  Service: Gastroenterology    WISDOM TOOTH EXTRACTION         Family History   Problem Relation Age of Onset    Anxiety disorder Mother     Hypertension Father     Depression Father     No Known Problems Brother     COPD Paternal Grandfather     Hypertension Paternal Grandfather     Arthritis Family     Cancer Family     Osteoporosis Family     No Known Problems Sister     No Known Problems Maternal Aunt     No Known Problems Maternal Uncle     No Known Problems Paternal Aunt     No Known Problems Paternal Uncle     No Known Problems Maternal Grandmother     No Known Problems Maternal Grandfather     No Known Problems Paternal Grandmother     ADD /  ADHD Brother     Anesthesia problems Neg Hx     Clotting disorder Neg Hx     Collagen disease Neg Hx     Diabetes Neg Hx     Dislocations Neg Hx     Learning disabilities Neg Hx     Neurological problems Neg Hx     Rheumatologic disease Neg Hx     Scoliosis Neg Hx     Vascular Disease Neg Hx      I have reviewed and agree with the history as documented.    E-Cigarette/Vaping    E-Cigarette Use Never User      E-Cigarette/Vaping Substances    Nicotine No     THC No     CBD No     Flavoring No     Other No     Unknown No      Social History     Tobacco Use    Smoking status: Never    Smokeless tobacco: Never   Vaping Use    Vaping status: Never Used   Substance Use Topics    Alcohol use: No    Drug use: No        Review of Systems   Constitutional: Negative.    HENT: Negative.     Respiratory: Negative.     Cardiovascular: Negative.    Gastrointestinal: Negative.    Musculoskeletal:  Positive for joint swelling.   Skin: Negative.    Neurological: Negative.        Physical Exam  ED Triage Vitals   Temperature Pulse Respirations Blood Pressure SpO2   09/07/24 1957 09/07/24 1957 09/07/24 1957 09/07/24 1957 09/07/24 1957   98.3 °F (36.8 °C) 88 18 140/85 98 %      Temp Source Heart Rate Source Patient Position - Orthostatic VS BP Location FiO2 (%)   09/07/24 1957 09/07/24 1957 09/07/24 1957 09/07/24 1957 --   Oral Monitor Lying Right arm       Pain Score       09/07/24 2030       8             Orthostatic Vital Signs  Vitals:    09/07/24 1957   BP: 140/85   Pulse: 88   Patient Position - Orthostatic VS: Lying       Physical Exam  Constitutional:       Appearance: Normal appearance.   HENT:      Head: Normocephalic and atraumatic.      Right Ear: External ear normal.      Left Ear: External ear normal.      Nose: Nose normal.      Mouth/Throat:      Pharynx: Oropharynx is clear.   Eyes:      Extraocular Movements: Extraocular movements intact.      Conjunctiva/sclera: Conjunctivae normal.      Pupils: Pupils are equal,  round, and reactive to light.   Cardiovascular:      Rate and Rhythm: Normal rate.      Pulses: Normal pulses.   Pulmonary:      Effort: Pulmonary effort is normal.   Musculoskeletal:      Cervical back: Normal range of motion.      Right knee: Normal.      Left knee: No bony tenderness or crepitus. Decreased range of motion. Tenderness present over the patellar tendon. No LCL laxity, MCL laxity, ACL laxity or PCL laxity.Normal alignment. Normal pulse.      Comments: Tenderness along the patellar tendon.  Decreased flexion at the knee.   Skin:     General: Skin is warm.      Capillary Refill: Capillary refill takes less than 2 seconds.   Neurological:      General: No focal deficit present.      Mental Status: He is alert and oriented to person, place, and time.   Psychiatric:         Mood and Affect: Mood normal.         Behavior: Behavior normal.         ED Medications  Medications   ketorolac (TORADOL) injection 15 mg (15 mg Intramuscular Given 9/7/24 2030)       Diagnostic Studies  Results Reviewed       None                   XR knee 4+ vw left injury   ED Interpretation by Cristhian Goncalves MD (09/07 2129)   No obvious fractures or dislocations            Procedures  Procedures      ED Course                                       Medical Decision Making  24-year-old male presenting to the ED with left knee pain.    Differential includes fracture, sprain, strain, tendon injury, ligament injury.    Will get x-ray of the left knee and give Toradol for pain management.    See ED course for interpretation of results and continued management.    No fractures noted on x-ray, patient Ace wrapped and given referral for orthopedic surgery.  Patient did not want crutches and was able to bear weight.  A work note was given to the patient.  Patient comfortable with discharge.  Patient discharged.    Amount and/or Complexity of Data Reviewed  Radiology: ordered and independent interpretation  performed.    Risk  Prescription drug management.          Disposition  Final diagnoses:   Left knee sprain     Time reflects when diagnosis was documented in both MDM as applicable and the Disposition within this note       Time User Action Codes Description Comment    9/7/2024  8:47 PM Cristhian Goncalves Add [S83.92XA] Left knee sprain           ED Disposition       ED Disposition   Discharge    Condition   Stable    Date/Time   Sat Sep 7, 2024  8:47 PM    Comment   Chad Cornejo discharge to home/self care.                   Follow-up Information       Follow up With Specialties Details Why Contact Info Additional Information    [facility], Nurse Practitioner   [address]  [facility] [address]  [phone]       [facility]   [address]  [facility] [address]  [phone] [facility] [address], [address], [place], [address]   [phone]            Discharge Medication List as of 9/7/2024  9:55 PM        CONTINUE these medications which have NOT CHANGED    Details   buPROPion (WELLBUTRIN XL) 300 mg 24 hr tablet Take 1 tablet (300 mg total) by mouth every morning, Starting Fri 8/30/2024, Until Wed 2/26/2025, Normal               PDMP Review       None             ED Provider  Attending physically available and evaluated Chad Cornejo. I managed the patient along with the ED Attending.    Electronically Signed by           Cristhian Goncalves MD  09/08/24 0003

## 2024-09-10 ENCOUNTER — OFFICE VISIT (OUTPATIENT)
Dept: OBGYN CLINIC | Facility: MEDICAL CENTER | Age: 24
End: 2024-09-10
Payer: COMMERCIAL

## 2024-09-10 ENCOUNTER — APPOINTMENT (OUTPATIENT)
Dept: RADIOLOGY | Facility: MEDICAL CENTER | Age: 24
End: 2024-09-10
Payer: COMMERCIAL

## 2024-09-10 VITALS
WEIGHT: 248 LBS | HEIGHT: 73 IN | HEART RATE: 72 BPM | BODY MASS INDEX: 32.87 KG/M2 | DIASTOLIC BLOOD PRESSURE: 91 MMHG | SYSTOLIC BLOOD PRESSURE: 141 MMHG

## 2024-09-10 DIAGNOSIS — M25.561 RIGHT KNEE PAIN, UNSPECIFIED CHRONICITY: ICD-10-CM

## 2024-09-10 DIAGNOSIS — S83.92XA LEFT KNEE SPRAIN: ICD-10-CM

## 2024-09-10 DIAGNOSIS — S83.241A OTHER TEAR OF MEDIAL MENISCUS, CURRENT INJURY, RIGHT KNEE, INITIAL ENCOUNTER: ICD-10-CM

## 2024-09-10 DIAGNOSIS — S83.242A OTHER TEAR OF MEDIAL MENISCUS, CURRENT INJURY, LEFT KNEE, INITIAL ENCOUNTER: Primary | ICD-10-CM

## 2024-09-10 PROCEDURE — 73564 X-RAY EXAM KNEE 4 OR MORE: CPT

## 2024-09-10 PROCEDURE — 99204 OFFICE O/P NEW MOD 45 MIN: CPT | Performed by: ORTHOPAEDIC SURGERY

## 2024-09-10 NOTE — PROGRESS NOTES
Ortho Sports Medicine Knee New Patient Visit     Assesment:   24 y.o. male left knee possible lateral meniscus tear    Right knee possible medial meniscus tear     Plan:    Conservative treatment:    Ice to knee for 20 minutes at least 1-2 times daily.  OTC NSAIDS prn for pain.  Work note written   ED notes reviewed    Imaging:    All imaging from today was reviewed by myself and explained to the patient.     We will obtain an MRI of the knee to rule out Left knee to r/o lateral meniscus tear and MRI of the right knee to r/o medial meniscus tear .    Follow up in 1-2 weeks for MRI review. Will make a definitive treatment plan based on the results of the MRI.      Injection:    No Injection planned at this time.      Surgery:     No surgery is recommended at this point, continue with conservative treatment plan as noted.      Follow up:    Return for 1 week after MRI to review the results .        Chief Complaint   Patient presents with    Left Knee - Pain    Right Knee - Pain       History of Present Illness:    The patient is a 24 y.o. 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      Pain is located medial aspect of the right knee and lateral aspect of the left knee. The patient rates the pain as a 6/10.  The pain has been present for 3 weeks.      The patient sustained an injury on 9/7/24.   Patient reports that he was at work and had to restrain a patient.  Patient states that it was a difficult restraint and that he did twist both knees during the event.  Patient reports that when the event initially occurred he only had pain within the left knee.  Patient reports that he was having pain about the lateral aspect.  Patient did experience swelling but was able to ambulate.  Following the event the patient did go to the ER for evaluation and x-ray imaging was obtained that was negative for  SPECIFIC PATIENT INSTRUCTIONS FROM THE PHYSICIAN WHO TREATED YOU IN THE ER TODAY:  1. Return if worse. 2.  The exact cause of your palpitations was not determined in the emergency department today. 3.  Follow up with your primary doctor in next 2-3 days for reevaluation and outpatient workup of your palpitations, which may include a Holter monitor placement. Patient Education        Palpitations: Care Instructions  Your Care Instructions     Heart palpitations are the uncomfortable sensation that your heart is beating fast or irregularly. You might feel pounding or fluttering in your chest. It might feel like your heart is skipping a beat. Although palpitations may be caused by a heart problem, they also occur because of stress, fatigue, or use of alcohol, caffeine, or nicotine. Many medicines, including diet pills, antihistamines, decongestants, and some herbal products, can cause heart palpitations. Nearly everyone has palpitations from time to time. Depending on your symptoms, your doctor may need to do more tests to try to find the cause of your palpitations. Follow-up care is a key part of your treatment and safety. Be sure to make and go to all appointments, and call your doctor if you are having problems. It's also a good idea to know your test results and keep a list of the medicines you take. How can you care for yourself at home? · Avoid caffeine, nicotine, and excess alcohol. · Do not take illegal drugs, such as methamphetamines and cocaine. · Do not take weight loss or diet medicines unless you talk with your doctor first.  · Get plenty of sleep. · Do not overeat. · If you have palpitations again, take deep breaths and try to relax. This may slow a racing heart. · If you start to feel lightheaded, lie down to avoid injuries that might result if you pass out and fall down. · Keep a record of your palpitations and bring it to your next doctor's appointment. Write down:  ?  The date and fracture.  Patient reports that he then woke up the next day and was experiencing pain within the right knee as well.  Patient reports that the pain in the right knee was about the medial aspect.  Patient denies any history of prior knee injuries or trauma.  Patient denies any prior knee surgeries.  Patient reports that the left knee is more painful than the right.  Patient does report having instability about the left knee.  Patient states that the left knee feels unstable and feels as though it is going to give out.  He pain is characterized as sharp, stabbing, dull, achy.  The pain is present daily.      Pain is improved by rest.  Pain is aggravated by stairs, squatting, weight bearing, walking, and pivoting on a planted foot.    Symptoms include clicking, catching, and swelling.     The patient has tried rest, ice, and NSAIDS.          Knee Surgical History:  None    Past Medical, Social and Family History:  History reviewed. No pertinent past medical history.  Past Surgical History:   Procedure Laterality Date    EYE SURGERY      HERNIA REPAIR      [place] ESOPHAGOGASTRODUODENOSCOPY TRANSORAL DIAGNOSTIC N/A 4/24/2019    Procedure: ESOPHAGOGASTRODUODENOSCOPY (EGD);  Surgeon: Diaz Evans MD;  Location: [facility];  Service: Gastroenterology    WISDOM TOOTH EXTRACTION       Allergies   Allergen Reactions    Cefzil [Cefprozil] Hives     Current Outpatient Medications on File Prior to Visit   Medication Sig Dispense Refill    buPROPion (WELLBUTRIN XL) 300 mg 24 hr tablet Take 1 tablet (300 mg total) by mouth every morning 90 tablet 1     No current facility-administered medications on file prior to visit.     Social History     Socioeconomic History    Marital status: Single     Spouse name: Not on file    Number of children: Not on file    Years of education: Not on file    Highest education level: Not on file   Occupational History    Not on file   Tobacco Use    Smoking status: Never    Smokeless tobacco: Never  time.  ? Your pulse. (If your heart is beating fast, it may be hard to count your pulse.)  ? What you were doing when the palpitations started. ? How long the palpitations lasted. ? Any other symptoms. · If an activity causes palpitations, slow down or stop. Talk to your doctor before you do that activity again. · Take your medicines exactly as prescribed. Call your doctor if you think you are having a problem with your medicine. When should you call for help? [id number] anytime you think you may need emergency care. For example, call if:  · You passed out (lost consciousness). · You have symptoms of a heart attack. These may include:  ? Chest pain or pressure, or a strange feeling in the chest.  ? Sweating. ? Shortness of breath. ? Pain, pressure, or a strange feeling in the back, neck, jaw, or upper belly or in one or both shoulders or arms. ? Lightheadedness or sudden weakness. ? A fast or irregular heartbeat. After you call 911, the  may tell you to chew 1 adult-strength or 2 to 4 low-dose aspirin. Wait for an ambulance. Do not try to drive yourself. · You have symptoms of a stroke. These may include:  ? Sudden numbness, tingling, weakness, or loss of movement in your face, arm, or leg, especially on only one side of your body. ? Sudden vision changes. ? Sudden trouble speaking. ? Sudden confusion or trouble understanding simple statements. ? Sudden problems with walking or balance. ? A sudden, severe headache that is different from past headaches. Call your doctor now or seek immediate medical care if:  · You have heart palpitations and:  ? Are dizzy or lightheaded, or you feel like you may faint. ? Have new or increased shortness of breath. Watch closely for changes in your health, and be sure to contact your doctor if:  · You continue to have heart palpitations. Where can you learn more?   Go to http://rey-angie.info/  Enter R508 in the search box to learn   Vaping Use    Vaping status: Never Used   Substance and Sexual Activity    Alcohol use: No    Drug use: No    Sexual activity: Yes     Partners: Female   Other Topics Concern    Not on file   Social History Narrative    Not on file     Social Determinants of Health     Financial Resource Strain: Low Risk  (5/16/2023)    [facility]    Overall Financial Resource Strain (CARDIA)     Difficulty of Paying Living Expenses: Not hard at all   Food Insecurity: No Food Insecurity (5/16/2023)    [facility]    Hunger Vital Sign     Worried About Running Out of Food in the Last Year: Never true     Ran Out of Food in the Last Year: Never true   Transportation Needs: No Transportation Needs (5/16/2023)    [facility]    PRAPARE - Transportation     Lack of Transportation (Medical): No     Lack of Transportation (Non-Medical): No   Physical Activity: Sufficiently Active (5/16/2023)    Received from [facility]    Exercise Vital Sign     Days of Exercise per Week: 7 days     Minutes of Exercise per Session: 120 min   Stress: No Stress Concern Present (5/16/2023)    [facility]    [place] of Occupational Health - Occupational Stress Questionnaire     Feeling of Stress : Not at all   Social Connections: Unknown (5/16/2023)    [facility]    Social Connection and Isolation Panel [NHANES]     Frequency of Communication with Friends and Family: More than three times a week     Frequency of Social Gatherings with Friends and Family: More than three times a week     Attends Judaism Services: Patient declined     Active Member of Clubs or Organizations: Patient declined     Attends Club or Organization  more about \"Palpitations: Care Instructions. \"  Current as of: 2019               Content Version: 12.5  © 1146-5071 Healthwise, Incorporated. Care instructions adapted under license by [employer] (which disclaims liability or warranty for this information). If you have questions about a medical condition or this instruction, always ask your healthcare professional. [address] any warranty or liability for your use of this information. [email or web address] Activation    Thank you for requesting access to [employer]. Please follow the instructions below to securely access and download your online medical record. [employer] allows you to send messages to your doctor, view your test results, renew your prescriptions, schedule appointments, and more. How Do I Sign Up? In your internet browser, go to [email or web address]. Click on the First Time User? Click Here link in the Sign In box. You will see the New Member Sign Up page. Enter your [employer] Access Code exactly as it appears below. You will not need to use this code after you´ve completed the sign-up process. If you do not sign up before the expiration date, you must request a new code. [employer] Access Code: [email or web address]  Expires: 3/28/2019  2:27 PM (This is the date your [employer] access code will )    Enter the last four digits of your Social Security Number (xxxx) and Date of Birth (mm/dd/yyyy) as indicated and click Submit. You will be taken to the next sign-up page. Create a [employer] ID. This will be your [employer] login ID and cannot be changed, so think of one that is secure and easy to remember. Create a [employer] password. You can change your password at any time. Enter your Password Reset Question and Answer. This can be used at a later time if you forget your password. Enter your e-mail address.  You will receive e-mail notification when new information is available in "Meetings: Patient declined     Marital Status: Never    Intimate Partner Violence: Not At Risk (5/16/2023)    [facility]    Humiliation, Afraid, Rape, and Kick questionnaire     Fear of Current or Ex-Partner: No     Emotionally Abused: No     Physically Abused: No     Sexually Abused: No   Housing Stability: Low Risk  (5/16/2023)    [facility]    Housing Stability Vital Sign     Unable to Pay for Housing in the Last Year: No     Number of Places Lived in the Last Year: 1     Unstable Housing in the Last Year: No         I have reviewed the past medical, surgical, social and family history, medications and allergies as documented in the EMR.    Review of systems: ROS is negative other than that noted in the HPI.  Constitutional: Negative for fatigue and fever.   HENT: Negative for sore throat.    Respiratory: Negative for shortness of breath.    Cardiovascular: Negative for chest pain.   Gastrointestinal: Negative for abdominal pain.   Endocrine: Negative for cold intolerance and heat intolerance.   Genitourinary: Negative for flank pain.   Musculoskeletal: Negative for back pain.   Skin: Negative for rash.   Allergic/Immunologic: Negative for immunocompromised state.   Neurological: Negative for dizziness.   Psychiatric/Behavioral: Negative for agitation.      Physical Exam:    Blood pressure 141/91, pulse 72, height 6' 1\" (1.854 m), weight 112 kg (248 lb).    General/Constitutional: NAD, well developed, well nourished  HENT: Normocephalic, atraumatic  CV: Intact distal pulses, regular rate  Resp: No respiratory distress or labored breathing  GI: Soft and non-tender   Lymphatic: No lymphadenopathy palpated  Neuro: Alert and Oriented x 3, no focal deficits  Psych: Normal mood, normal affect, normal judgement, normal behavior  Skin: Warm, dry, no rashes, no erythema      Knee Exam " [employer]store. Click Sign Up. You can now view and download portions of your medical record. Click the [facility] link to download a portable copy of your medical information. Additional Information    If you have questions, please visit the Frequently Asked Questions section of the [employer]store website at [email or web address] Remember, [employer] is NOT to be used for urgent needs. For medical emergencies, dial 911. (focused):                RIGHT LEFT   ROM:   0-130 0-130   Palpation: Effusion negative negative     MJL tenderness Positive Negative     LJL tenderness Negative Positive   Meniscus: Natasha Positive Positive    Apley's Compression Positive Positive   Instability: Varus stable stable     Valgus stable stable   Special Tests: Lachman Negative Negative     Posterior drawer Negative Negative     Anterior drawer Negative Negative     Pivot shift not tested not tested     Dial not tested not tested   Patella: Palpation no tenderness no tenderness     Mobility 1/4 1/4     Apprehension Negative Negative   Other: Single leg 1/4 squat not tested not tested      LE NV Exam: +2 DP/PT pulses bilaterally  Sensation intact to light touch L2-S1 bilaterally     Bilateral hip ROM demonstrates no pain actively or passively    No calf tenderness to palpation bilaterally    Knee Imaging    X-rays of the left knee were reviewed, which demonstrate no acute fracture or osseous abnormality.  I have reviewed the radiology report and agree with their impression.

## 2024-09-10 NOTE — LETTER
September 10, 2024     Patient: Chad Cornejo  YOB: 2000  Date of Visit: 9/10/2024      To Whom it May Concern:    Chad Cornejo is under my professional care. Chad was seen in my office on 9/10/2024. Chad is unable to return to work at this time due to recent bilateral knee injury. We are obtaining MRI imaging. He is to remain out of work until seen back in the office 1 week after the MRI.    If you have any questions or concerns, please don't hesitate to call.         Sincerely,          Harpreet Leach DO        CC: No Recipients

## 2024-09-19 ENCOUNTER — HOSPITAL ENCOUNTER (OUTPATIENT)
Dept: MRI IMAGING | Facility: HOSPITAL | Age: 24
Discharge: HOME | End: 2024-09-19
Payer: COMMERCIAL

## 2024-09-19 DIAGNOSIS — S83.241A OTHER TEAR OF MEDIAL MENISCUS, CURRENT INJURY, RIGHT KNEE, INITIAL ENCOUNTER: ICD-10-CM

## 2024-09-19 DIAGNOSIS — S83.242A OTHER TEAR OF MEDIAL MENISCUS, CURRENT INJURY, LEFT KNEE, INITIAL ENCOUNTER: ICD-10-CM

## 2024-09-19 PROCEDURE — 73721 MRI JNT OF LWR EXTRE W/O DYE: CPT

## 2024-09-20 ENCOUNTER — TELEMEDICINE (OUTPATIENT)
Dept: PSYCHIATRY | Facility: CLINIC | Age: 24
End: 2024-09-20
Payer: COMMERCIAL

## 2024-09-20 DIAGNOSIS — F41.9 ANXIETY: Primary | ICD-10-CM

## 2024-09-20 PROCEDURE — 90832 PSYTX W PT 30 MINUTES: CPT | Performed by: SOCIAL WORKER

## 2024-09-20 NOTE — BH TREATMENT PLAN
"Outpatient Behavioral Health Psychotherapy Treatment Plan    Chad ARRIAZA Clemente  2000     Date of Initial Psychotherapy Assessment: 8/23/2024   Date of Current Treatment Plan: 09/20/24  Treatment Plan Target Date: 3/20/2025  Treatment Plan Expiration Date: 3/20/2025    Diagnosis:   1. Anxiety            Area(s) of Need: Chad reports that he feels like he has a pretty good grasp on his mental health, but explains that he would like to focus on maintaining his progress.     Long Term Goal 1 (in the client's own words): \"I want to continue routine maintenance to manage my anxiety.\"     Stage of Change: Maintenance    Target Date for completion: 3/20/2025     Anticipated therapeutic modalities: Person-centered approach, talk therapy, and supportive therapy.     People identified to complete this goal: Client and Therapist       Objective 1: (identify the means of measuring success in meeting the objective): Chad will maintain involvement in work, family, and social activities.      Objective 2: (identify the means of measuring success in meeting the objective): Chad will return for follow-up sessions to track progress, reinforce gains, and problem-solve barriers.      I am currently under the care of a [facility] psychiatric provider: no    My [facility] psychiatric provider is: N/A    I am currently taking psychiatric medications: Yes, as prescribed    I feel that I will be ready for discharge from mental health care when I reach the following (measurable goal/objective): \"Progress would be having symptoms subside and functioning better overall.\"    For children and adults who have a legal guardian:   Has there been any change to custody orders and/or guardianship status? NA. If yes, attach updated documentation.    I have created my Crisis Plan and have been offered a copy of this plan    Behavioral Health Treatment Plan [facility]: Diagnosis and Treatment Plan explained to Chad Cornejo acknowledges " an understanding of their diagnosis. Chad Cornejo agrees to this treatment plan.    I have been offered a copy of this Treatment Plan. yes      Chad Cornejo, 2000, actively participated in the creation of this treatment plan during a virtual session, using the Epic Embedded platform.   Chad Cornejo  provided verbal consent on 9/20/2024 at 4:14 PM. The treatment plan was transcribed into the Electronic Health Record at a later time.

## 2024-09-20 NOTE — PSYCH
DATA: Met with Chad for scheduled individual session. Topics of discussion included mood regulation and symptoms. Client shows evidence of utilizing emotion regulation skills skills to manage mental health symptoms. During this session, this clinician used the following therapeutic modalities: engagement strategies, supportive psychotherapy, and client-centered therapy.  Chad reports that he has been doing pretty good since his initial assessment. He tells Therapist that his Wellbutrin was increased and he experienced side effects from it. He explains that he doesn't think it needed to be increased due to how well his ADHD symptoms were managed. He expresses interest in a psych evaluation for medication management. Therapist submits referral for request. Therapist and Chad also create his treatment plan and safety plan today. He does not present with any immediate concerns.    ASSESSMENT: Chad presents with a normal mood. His affect is normal range and intensity, appropriate. Chad exhibits growing therapeutic rapport with this clinician. Chad continues to exhibit willingness to work on treatment goals and objectives. Chad presents with a minimal risk of suicide, minimal risk of self-harm, and minimal risk of harm to others.       PLAN: Chda will return in 5 weeks for the next scheduled session. Between sessions, Chad will review and sign treatment plan and safety plan and will report back during the next session re: successes and barriers. At the next session, this clinician will use engagement strategies, supportive psychotherapy, and client-centered therapy to address Chad's anxiety, in an effort to assist Chad with meeting treatment goals.   Virtual Regular Visit    Verification of patient location:    Patient is located at Home in the following state in which I hold an active license [place]      Assessment/Plan:    Problem List Items Addressed This Visit       Anxiety - Primary       Goals addressed in session: Goal  1          Reason for visit is No chief complaint on file.       Encounter provider Kamilah Mullins LCSW      Recent Visits  No visits were found meeting these conditions.  Showing recent visits within past 7 days and meeting all other requirements  Future Appointments  No visits were found meeting these conditions.  Showing future appointments within next 150 days and meeting all other requirements       The patient was identified by name and date of birth. Chad Cornejo was informed that this is a telemedicine visit and that the visit is being conducted throughthe Epic Embedded platform. He agrees to proceed..  My office door was closed. No one else was in the room.  He acknowledged consent and understanding of privacy and security of the video platform. The patient has agreed to participate and understands they can discontinue the visit at any time.    Patient is aware this is a billable service.     Subjective  Chad Cornejo is a 24 y.o. male who presents for an individual therapy session today .      HPI     No past medical history on file.    Past Surgical History:   Procedure Laterality Date    EYE SURGERY      HERNIA REPAIR      [place] ESOPHAGOGASTRODUODENOSCOPY TRANSORAL DIAGNOSTIC N/A 4/24/2019    Procedure: ESOPHAGOGASTRODUODENOSCOPY (EGD);  Surgeon: Diaz Evans MD;  Location: [facility] GI LAB;  Service: Gastroenterology    WISDOM TOOTH EXTRACTION         Current Outpatient Medications   Medication Sig Dispense Refill    buPROPion (WELLBUTRIN XL) 300 mg 24 hr tablet Take 1 tablet (300 mg total) by mouth every morning 90 tablet 1     No current facility-administered medications for this visit.        Allergies   Allergen Reactions    Cefzil [Cefprozil] Hives       Review of Systems    Video Exam    There were no vitals filed for this visit.    Physical Exam     Visit Time    09/20/24  Start Time: 1604  Stop Time: 1622  Total Visit Time: 18 minutes

## 2024-09-20 NOTE — BH CRISIS PLAN
Client Name: Chad Cornejo       Client YOB: 2000    [place] Safety Plan      Creation Date: 9/20/24 Update Date: 9/19/25   Created By: Kamilah Mullins LCSW       Step 1: Warning Signs:   Warning Signs   Losing track of my thoughts            Step 2: Internal Coping Strategies:   Internal Coping Strategies   Deep breaths and slow myself down            Step 3: People and social settings that provide distraction:   Name Contact Information   Niranjan (friend) in phone   Jean Carlos (friend) in phone    Places   Walking trails   Gym           Step 4: People whom I can ask for help during a crisis:      Name Contact Information    Niranjan in phone    Jean Carlos in phone      Step 5: Professionals or agencies I can contact during a crisis:      Clinican/Agency Name Phone Emergency Contact    Kamilah Mullins/[facility] [phone]       [facility] Emergency Department Emergency Department Phone Emergency Department Address    [facility] [phone] [address] [place] [address]        Crisis Phone Numbers:   Suicide Prevention Lifeline: Call or Text  229 Crisis Text Line: Text HOME to 896-486   Please note: Some [facility] do not have a separate number for Child/Adolescent specific crisis. If your county is not listed under Child/Adolescent, please call the adult number for your county      Adult Crisis Numbers: Child/Adolescent Crisis Numbers   [facility]: [phone] [facility]: [phone]   [facility]: [phone] [facility]: [phone]   [facility]: [phone] [place]: [phone]   [facility]: [phone] [place]: [phone]   [place] [facility]: [phone]   [facility]: [phone]   [facility]: [phone]   [facility]: [phone] (daytime) [phone] (after hours, weekends, holidays)      Step 6: Making the environment safer (plan for lethal means safety):   Patient did not  "identify any lethal methods: Yes     Optional: What is most important to me and worth living for?   \"I want to experience life and do things.\"     Ranjit Safety Plan. Charlotte Chambers and [employer]. Used with permission of the authors.           "

## 2024-09-22 NOTE — ED ATTENDING ATTESTATION
9/7/2024  I, Junior Shankar MD, saw and evaluated the patient. I have discussed the patient with the resident/non-physician practitioner and agree with the resident's/non-physician practitioner's findings, Plan of Care, and MDM as documented in the resident's/non-physician practitioner's note, except where noted. All available labs and Radiology studies were reviewed.  I was present for key portions of any procedure(s) performed by the resident/non-physician practitioner and I was immediately available to provide assistance.       At this point I agree with the current assessment done in the Emergency Department.  I have conducted an independent evaluation of this patient a history and physical is as follows:    ED Course  ED Course as of 09/22/24 1757   Sat Sep 07, 2024   2055 No acute fracture or dislocation on the x-ray.         Critical Care Time  Procedures

## 2024-09-29 PROBLEM — Z11.3 SCREENING FOR STD (SEXUALLY TRANSMITTED DISEASE): Status: RESOLVED | Noted: 2024-08-30 | Resolved: 2024-09-29

## 2024-09-30 ENCOUNTER — OFFICE VISIT (OUTPATIENT)
Dept: FAMILY MEDICINE CLINIC | Facility: CLINIC | Age: 24
End: 2024-09-30
Payer: COMMERCIAL

## 2024-09-30 VITALS
WEIGHT: 249 LBS | BODY MASS INDEX: 33 KG/M2 | HEIGHT: 73 IN | DIASTOLIC BLOOD PRESSURE: 80 MMHG | SYSTOLIC BLOOD PRESSURE: 138 MMHG | OXYGEN SATURATION: 96 % | HEART RATE: 76 BPM | TEMPERATURE: 97.8 F

## 2024-09-30 DIAGNOSIS — F41.1 GENERALIZED ANXIETY DISORDER: ICD-10-CM

## 2024-09-30 DIAGNOSIS — F90.0 ADHD (ATTENTION DEFICIT HYPERACTIVITY DISORDER), INATTENTIVE TYPE: Primary | ICD-10-CM

## 2024-09-30 PROCEDURE — 99214 OFFICE O/P EST MOD 30 MIN: CPT

## 2024-09-30 RX ORDER — DEXTROAMPHETAMINE SACCHARATE, AMPHETAMINE ASPARTATE MONOHYDRATE, DEXTROAMPHETAMINE SULFATE AND AMPHETAMINE SULFATE 5; 5; 5; 5 MG/1; MG/1; MG/1; MG/1
20 CAPSULE, EXTENDED RELEASE ORAL EVERY MORNING
Qty: 30 CAPSULE | Refills: 0 | Status: SHIPPED | OUTPATIENT
Start: 2024-09-30

## 2024-09-30 NOTE — PROGRESS NOTES
Ambulatory Visit  Name: Chad Cornejo      : 2000      MRN: [id number]  Encounter Provider: AYAN Holguin  Encounter Date: 2024   Encounter department: [facility]    Assessment & Plan  ADHD (attention deficit hyperactivity disorder), inattentive type  Pt completed Adult ADHD Self Reporting Scale Checklist symptoms consistent with DSM-V criteria for ADHD. Pt has tried taking Wellbutrin  reports not effective in controlling symptoms. Discussed Adderall use and potential medication side effects. Controlled substance agreement form completed and pt to  f/u in 4 weeks.  Orders:    amphetamine-dextroamphetamine (ADDERALL XR, 20MG,) 20 MG 24 hr capsule; Take 1 capsule (20 mg total) by mouth every morning Max Daily Amount: 20 mg    Generalized anxiety disorder  Pt reports anxiety worse feels secondary to inability to focus and complete tasks. Pt has been taking Wellbutrin  mg however reports medication worsens anxiety symptoms. Will discontinue medication and pt to start medication for ADHD.          History of Present Illness     Pt presents for f/u anxiety medication and evaluation for ADHD. Pt reports inability to focus or complete tasks, inattentiveness not improved on Wellbutrin  mg. Pt requesting evaluation for ADHD and alternate medication, has been on wait list for psychiatry.        History obtained from : patient  Review of Systems   Constitutional:  Negative for activity change, appetite change, chills, diaphoresis, fatigue and fever.   HENT:  Positive for tinnitus. Negative for voice change.    Eyes:  Negative for photophobia, pain, discharge, redness, itching and visual disturbance.   Respiratory:  Negative for chest tightness and shortness of breath.    Cardiovascular:  Negative for chest pain and palpitations.   Gastrointestinal:  Negative for abdominal distention, abdominal pain, anal bleeding, diarrhea, nausea and vomiting.  "  Endocrine: Negative for cold intolerance, heat intolerance, polydipsia and polyphagia.   Genitourinary:  Negative for difficulty urinating, dysuria and frequency.   Musculoskeletal:  Negative for arthralgias.   Skin:  Negative for color change.   Allergic/Immunologic: Negative for environmental allergies.   Neurological:  Negative for dizziness, tremors, seizures, syncope, facial asymmetry, speech difficulty, weakness, light-headedness, numbness and headaches.   Hematological:  Negative for adenopathy.   Psychiatric/Behavioral:  Positive for agitation and decreased concentration. Negative for behavioral problems, confusion, dysphoric mood, hallucinations, self-injury, sleep disturbance and suicidal ideas. The patient is nervous/anxious. The patient is not hyperactive.    All other systems reviewed and are negative.    Current Outpatient Medications on File Prior to Visit   Medication Sig Dispense Refill    [DISCONTINUED] buPROPion (WELLBUTRIN XL) 300 mg 24 hr tablet Take 1 tablet (300 mg total) by mouth every morning 90 tablet 1     No current facility-administered medications on file prior to visit.      Social History     Tobacco Use    Smoking status: Never    Smokeless tobacco: Never   Vaping Use    Vaping status: Never Used   Substance and Sexual Activity    Alcohol use: No    Drug use: No    Sexual activity: Yes     Partners: Female         Objective     /80 (BP Location: Right arm, Patient Position: Sitting, Cuff Size: Adult)   Pulse 76   Temp 97.8 °F (36.6 °C) (Tympanic)   Ht 6' 1\" (1.854 m)   Wt 113 kg (249 lb)   SpO2 96%   BMI 32.85 kg/m²     Physical Exam  Vitals and nursing note reviewed.   Constitutional:       General: He is not in acute distress.     Appearance: Normal appearance. He is not ill-appearing, toxic-appearing or diaphoretic.   HENT:      Head: Normocephalic and atraumatic.      Right Ear: Tympanic membrane, ear canal and external ear normal. There is no impacted cerumen.      " Left Ear: Tympanic membrane, ear canal and external ear normal. There is no impacted cerumen.      Nose: Nose normal. No congestion or rhinorrhea.      Mouth/Throat:      Mouth: Mucous membranes are moist.      Pharynx: Oropharynx is clear. No oropharyngeal exudate or posterior oropharyngeal erythema.   Eyes:      General: No scleral icterus.        Right eye: No discharge.         Left eye: No discharge.      Extraocular Movements: Extraocular movements intact.      Conjunctiva/sclera: Conjunctivae normal.      Pupils: Pupils are equal, round, and reactive to light.   Neck:      Vascular: No carotid bruit.   Cardiovascular:      Rate and Rhythm: Normal rate and regular rhythm.      Pulses: Normal pulses.      Heart sounds: Normal heart sounds. No murmur heard.     No friction rub. No gallop.   Pulmonary:      Effort: Pulmonary effort is normal. No respiratory distress.      Breath sounds: Normal breath sounds. No stridor. No wheezing, rhonchi or rales.   Chest:      Chest wall: No tenderness.   Abdominal:      General: Bowel sounds are normal. There is no distension.      Palpations: Abdomen is soft. There is no mass.      Tenderness: There is no abdominal tenderness. There is no right CVA tenderness, left CVA tenderness, guarding or rebound.      Hernia: No hernia is present.   Musculoskeletal:         General: No swelling, tenderness, deformity or signs of injury. Normal range of motion.      Cervical back: Normal range of motion and neck supple. No rigidity or tenderness.      Right lower leg: No edema.      Left lower leg: No edema.   Lymphadenopathy:      Cervical: No cervical adenopathy.   Skin:     General: Skin is warm.      Capillary Refill: Capillary refill takes less than 2 seconds.      Coloration: Skin is not jaundiced or pale.      Findings: No bruising, erythema, lesion or rash.   Neurological:      General: No focal deficit present.      Mental Status: He is alert and oriented to person, place, and  time.      Cranial Nerves: No cranial nerve deficit.      Sensory: No sensory deficit.      Motor: No weakness.      Coordination: Coordination normal.      Gait: Gait normal.      Deep Tendon Reflexes: Reflexes normal.   Psychiatric:         Attention and Perception: Attention and perception normal. He is attentive. He does not perceive auditory or visual hallucinations.         Mood and Affect: Mood and affect normal. Mood is not anxious, depressed or elated. Affect is not labile, blunt, flat, angry, tearful or inappropriate.         Speech: Speech normal. Speech is not rapid and pressured.         Behavior: Behavior normal. Behavior is not agitated, slowed, aggressive, withdrawn, hyperactive or combative. Behavior is cooperative.         Thought Content: Thought content normal. Thought content is not paranoid or delusional. Thought content does not include homicidal or suicidal ideation. Thought content does not include homicidal or suicidal plan.         Cognition and Memory: Cognition normal.         Judgment: Judgment normal. Judgment is not impulsive or inappropriate.

## 2024-09-30 NOTE — ASSESSMENT & PLAN NOTE
Pt reports anxiety worse feels secondary to inability to focus and complete tasks. Pt has been taking Wellbutrin  mg however reports medication worsens anxiety symptoms. Will discontinue medication and pt to start medication for ADHD.

## 2024-10-01 ENCOUNTER — OFFICE VISIT (OUTPATIENT)
Dept: OBGYN CLINIC | Facility: MEDICAL CENTER | Age: 24
End: 2024-10-01
Payer: OTHER MISCELLANEOUS

## 2024-10-01 VITALS
BODY MASS INDEX: 33 KG/M2 | SYSTOLIC BLOOD PRESSURE: 134 MMHG | HEIGHT: 73 IN | WEIGHT: 249 LBS | DIASTOLIC BLOOD PRESSURE: 83 MMHG | HEART RATE: 67 BPM

## 2024-10-01 DIAGNOSIS — M22.2X1 PATELLOFEMORAL PAIN SYNDROME OF BOTH KNEES: Primary | ICD-10-CM

## 2024-10-01 DIAGNOSIS — M22.2X2 PATELLOFEMORAL PAIN SYNDROME OF BOTH KNEES: Primary | ICD-10-CM

## 2024-10-01 DIAGNOSIS — M25.561 RIGHT KNEE PAIN, UNSPECIFIED CHRONICITY: ICD-10-CM

## 2024-10-01 PROCEDURE — 99214 OFFICE O/P EST MOD 30 MIN: CPT | Performed by: ORTHOPAEDIC SURGERY

## 2024-10-01 NOTE — PROGRESS NOTES
Ortho Sports Medicine Knee New Patient Visit     Assesment:   24 y.o. male bilateral knee patellofemoral pain with negative MRIs after injury work    Plan:    Conservative treatment:    Ice to knee for 20 minutes at least 1-2 times daily.  OTC NSAIDS prn for pain.  Return to work full duty note written  MRI is reviewed today  Patient declined physical therapy but will consider in the future if he continues to have pain and will progress himself with range of motion and strengthening on his own    Follow up:    No follow-ups on file.        Chief Complaint   Patient presents with    Left Knee - Pain     Left knee hurts more.    Right Knee - Pain       History of Present Illness:    The patient is a 24 y.o. 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      Pain is located medial aspect of the right knee and lateral aspect of the left knee. The patient rates the pain as a 6/10.  The pain has been present for 3 weeks.      The patient sustained an injury on 9/7/24.   Patient reports that he was at work and had to restrain a patient.  Patient states that it was a difficult restraint and that he did twist both knees during the event.  Patient reports that when the event initially occurred he only had pain within the left knee.  Patient reports that he was having pain about the lateral aspect.  Patient did experience swelling but was able to ambulate.  Following the event the patient did go to the ER for evaluation and x-ray imaging was obtained that was negative for fracture.  Patient reports that he then woke up the next day and was experiencing pain within the right knee as well.  Patient reports that the pain in the right knee was about the medial aspect.  Patient denies any history of prior knee injuries or trauma.  Patient denies any prior knee surgeries.  Patient reports that the left knee is more  painful than the right.  Patient does report having instability about the left knee.  Patient states that the left knee feels unstable and feels as though it is going to give out.  He pain is characterized as sharp, stabbing, dull, achy.  The pain is present daily.      Pain is improved by rest.  Pain is aggravated by stairs, squatting, weight bearing, walking, and pivoting on a planted foot.    Symptoms include clicking, catching, and swelling.     The patient has tried rest, ice, and NSAIDS.    He denies any new injuries.  He has obtained his MRI.  He is here to review today.      Knee Surgical History:  None    Past Medical, Social and Family History:  History reviewed. No pertinent past medical history.  Past Surgical History:   Procedure Laterality Date    EYE SURGERY      HERNIA REPAIR      NE ESOPHAGOGASTRODUODENOSCOPY TRANSORAL DIAGNOSTIC N/A 4/24/2019    Procedure: ESOPHAGOGASTRODUODENOSCOPY (EGD);  Surgeon: Diaz Evans MD;  Location: [facility];  Service: Gastroenterology    WISDOM TOOTH EXTRACTION       Allergies   Allergen Reactions    Cefzil [Cefprozil] Hives     Current Outpatient Medications on File Prior to Visit   Medication Sig Dispense Refill    amphetamine-dextroamphetamine (ADDERALL XR, 20MG,) 20 MG 24 hr capsule Take 1 capsule (20 mg total) by mouth every morning Max Daily Amount: 20 mg 30 capsule 0     No current facility-administered medications on file prior to visit.     Social History     Socioeconomic History    Marital status: Single     Spouse name: Not on file    Number of children: Not on file    Years of education: Not on file    Highest education level: Not on file   Occupational History    Not on file   Tobacco Use    Smoking status: Never    Smokeless tobacco: Never   Vaping Use    Vaping status: Never Used   Substance and Sexual Activity    Alcohol use: No    Drug use: No    Sexual activity: Yes     Partners: Female   Other Topics Concern    Not on file   Social History  Narrative    Not on file     Social Determinants of Health     Financial Resource Strain: Low Risk  (5/16/2023)    [facility]    Overall Financial Resource Strain (CARDIA)     Difficulty of Paying Living Expenses: Not hard at all   Food Insecurity: No Food Insecurity (5/16/2023)    [facility]    Hunger Vital Sign     Worried About Running Out of Food in the Last Year: Never true     Ran Out of Food in the Last Year: Never true   Transportation Needs: No Transportation Needs (5/16/2023)    [facility]    PRAPARE - Transportation     Lack of Transportation (Medical): No     Lack of Transportation (Non-Medical): No   Physical Activity: Sufficiently Active (5/16/2023)    Received from [facility]    Exercise Vital Sign     Days of Exercise per Week: 7 days     Minutes of Exercise per Session: 120 min   Stress: No Stress Concern Present (5/16/2023)    [facility]    [employer] - Occupational Stress Questionnaire     Feeling of Stress : Not at all   Social Connections: Unknown (5/16/2023)    [facility] from [facility]    Social Connection and Isolation Panel [NHANES]     Frequency of Communication with Friends and Family: More than three times a week     Frequency of Social Gatherings with Friends and Family: More than three times a week     Attends Denominational Services: Patient declined     Active Member of Clubs or Organizations: Patient declined     Attends Club or Organization Meetings: Patient declined     Marital Status: Never    Intimate Partner Violence: Not At Risk (5/16/2023)    [facility]    Humiliation, Afraid,  "Rape, and Kick questionnaire     Fear of Current or Ex-Partner: No     Emotionally Abused: No     Physically Abused: No     Sexually Abused: No   Housing Stability: Low Risk  (5/16/2023)    [facility]    Housing Stability Vital Sign     Unable to Pay for Housing in the Last Year: No     Number of Places Lived in the Last Year: 1     Unstable Housing in the Last Year: No         I have reviewed the past medical, surgical, social and family history, medications and allergies as documented in the EMR.    Review of systems: ROS is negative other than that noted in the HPI.  Constitutional: Negative for fatigue and fever.   HENT: Negative for sore throat.    Respiratory: Negative for shortness of breath.    Cardiovascular: Negative for chest pain.   Gastrointestinal: Negative for abdominal pain.   Endocrine: Negative for cold intolerance and heat intolerance.   Genitourinary: Negative for flank pain.   Musculoskeletal: Negative for back pain.   Skin: Negative for rash.   Allergic/Immunologic: Negative for immunocompromised state.   Neurological: Negative for dizziness.   Psychiatric/Behavioral: Negative for agitation.      Physical Exam:    Blood pressure 134/83, pulse 67, height 6' 1\" (1.854 m), weight 113 kg (249 lb).    General/Constitutional: NAD, well developed, well nourished  HENT: Normocephalic, atraumatic  CV: Intact distal pulses, regular rate  Resp: No respiratory distress or labored breathing  GI: Soft and non-tender   Lymphatic: No lymphadenopathy palpated  Neuro: Alert and Oriented x 3, no focal deficits  Psych: Normal mood, normal affect, normal judgement, normal behavior  Skin: Warm, dry, no rashes, no erythema      Knee Exam (focused):                RIGHT LEFT   ROM:   0-130 0-130   Palpation: Effusion negative negative     MJL tenderness Positive Negative     LJL tenderness Negative Positive   Meniscus: Natasha Positive Positive    Apley's " Compression Positive Positive   Instability: Varus stable stable     Valgus stable stable   Special Tests: Lachman Negative Negative     Posterior drawer Negative Negative     Anterior drawer Negative Negative     Pivot shift not tested not tested     Dial not tested not tested   Patella: Palpation no tenderness no tenderness     Mobility 1/4 1/4     Apprehension Negative Negative   Other: Single leg 1/4 squat not tested not tested      LE NV Exam: +2 DP/PT pulses bilaterally  Sensation intact to light touch L2-S1 bilaterally     Bilateral hip ROM demonstrates no pain actively or passively    No calf tenderness to palpation bilaterally    Knee Imaging    X-rays of the left knee were reviewed, which demonstrate no acute fracture or osseous abnormality.  I have reviewed the radiology report and agree with their impression.    MRI of the right knee and left knee demonstrates no internal derangements.  I reviewed the radiology report and agree with the impression

## 2024-10-01 NOTE — LETTER
October 1, 2024     Patient: Chad Cornejo  YOB: 2000  Date of Visit: 10/1/2024      To Whom it May Concern:    Chad Cornejo is under my professional care. Chad was seen in my office on 10/1/2024. Chad may return to work on 10-2-24 with no restrictions .    If you have any questions or concerns, please don't hesitate to call.         Sincerely,          Harpreet Leach DO        CC: No Recipients

## 2024-10-04 ENCOUNTER — TELEPHONE (OUTPATIENT)
Age: 24
End: 2024-10-04

## 2024-10-04 NOTE — TELEPHONE ENCOUNTER
Received [employer] from [facility] provider pt is interested in med mgmt. Writer contacted pt in regards to scheduling. Patient shared is interested in services and verified information.     NP med mgmt appt Virt[facility] 11/1 at 9 am with Margot SAHA

## 2024-10-11 ENCOUNTER — TELEPHONE (OUTPATIENT)
Dept: PSYCHIATRY | Facility: CLINIC | Age: 24
End: 2024-10-11

## 2024-10-11 NOTE — TELEPHONE ENCOUNTER
One week follow up call for New Patient appointment with Margot Ace [[id number]] on 11/1/24  was made on 10/4/24. Writer informed patient of New Patient paperwork needing to be completed 5 days prior to the appointment. Writer confirmed paperwork has been sent via My Chart.

## 2024-10-16 ENCOUNTER — TELEPHONE (OUTPATIENT)
Dept: PSYCHIATRY | Facility: CLINIC | Age: 24
End: 2024-10-16

## 2024-10-16 NOTE — TELEPHONE ENCOUNTER
Left detailed message for patient requesting a call back to R/S 10/25 4pm appt with Kamilah Mullins. Provider will not be in the office at the time. Left TA number.

## 2024-10-18 DIAGNOSIS — F90.0 ADHD (ATTENTION DEFICIT HYPERACTIVITY DISORDER), INATTENTIVE TYPE: ICD-10-CM

## 2024-10-28 RX ORDER — DEXTROAMPHETAMINE SACCHARATE, AMPHETAMINE ASPARTATE MONOHYDRATE, DEXTROAMPHETAMINE SULFATE AND AMPHETAMINE SULFATE 5; 5; 5; 5 MG/1; MG/1; MG/1; MG/1
20 CAPSULE, EXTENDED RELEASE ORAL EVERY MORNING
Qty: 30 CAPSULE | Refills: 0 | Status: SHIPPED | OUTPATIENT
Start: 2024-10-28 | End: 2024-11-01

## 2024-11-01 ENCOUNTER — OFFICE VISIT (OUTPATIENT)
Dept: FAMILY MEDICINE CLINIC | Facility: CLINIC | Age: 24
End: 2024-11-01
Payer: COMMERCIAL

## 2024-11-01 ENCOUNTER — TELEMEDICINE (OUTPATIENT)
Dept: PSYCHIATRY | Facility: CLINIC | Age: 24
End: 2024-11-01
Payer: COMMERCIAL

## 2024-11-01 VITALS
SYSTOLIC BLOOD PRESSURE: 135 MMHG | HEART RATE: 70 BPM | RESPIRATION RATE: 16 BRPM | HEIGHT: 73 IN | OXYGEN SATURATION: 100 % | BODY MASS INDEX: 32.6 KG/M2 | WEIGHT: 246 LBS | DIASTOLIC BLOOD PRESSURE: 73 MMHG | TEMPERATURE: 97.2 F

## 2024-11-01 DIAGNOSIS — B07.9 VIRAL WARTS, UNSPECIFIED TYPE: ICD-10-CM

## 2024-11-01 DIAGNOSIS — F90.9 ATTENTION DEFICIT HYPERACTIVITY DISORDER (ADHD), UNSPECIFIED ADHD TYPE: Primary | ICD-10-CM

## 2024-11-01 DIAGNOSIS — F90.1 ADHD, PREDOMINANTLY HYPERACTIVE TYPE: Primary | ICD-10-CM

## 2024-11-01 PROCEDURE — 99214 OFFICE O/P EST MOD 30 MIN: CPT

## 2024-11-01 PROCEDURE — 90792 PSYCH DIAG EVAL W/MED SRVCS: CPT | Performed by: STUDENT IN AN ORGANIZED HEALTH CARE EDUCATION/TRAINING PROGRAM

## 2024-11-01 RX ORDER — DEXTROAMPHETAMINE SACCHARATE, AMPHETAMINE ASPARTATE MONOHYDRATE, DEXTROAMPHETAMINE SULFATE AND AMPHETAMINE SULFATE 3.75; 3.75; 3.75; 3.75 MG/1; MG/1; MG/1; MG/1
15 CAPSULE, EXTENDED RELEASE ORAL EVERY MORNING
Qty: 30 CAPSULE | Refills: 0 | Status: SHIPPED | OUTPATIENT
Start: 2024-11-01 | End: 2024-12-01

## 2024-11-01 NOTE — PROGRESS NOTES
Ambulatory Visit  Name: Chad Cornejo      : 2000      MRN: [id number]  Encounter Provider: AYAN Holguin  Encounter Date: 2024   Encounter department: [facility]    Assessment & Plan  Attention deficit hyperactivity disorder (ADHD), unspecified ADHD type  Pt reports improved symptoms on Adderall XR 20 mg qd, states performance at school has improved and able to focus. Continue current medication dose, pt has psych appt for today 24.       Viral warts, unspecified type  Common wart on RLE pt tried removing without success, refer to dermatology and pt will try OTC Compound W wart remover  Orders:    Ambulatory Referral to Dermatology; Future       History of Present Illness     Pt presents for f/u ADHD reports Adderall XR 20 mg has been effective able to focus more and has noticed improved performance at school.     Pt has wart on RLE has tried removing with at home remedies suggest OTC compound W and refer to dermatology.        History obtained from : patient  Review of Systems   Constitutional:  Negative for activity change, fatigue and fever.   HENT:  Negative for sore throat.    Eyes:  Negative for visual disturbance.   Respiratory:  Negative for shortness of breath.    Cardiovascular:  Negative for chest pain.   Gastrointestinal:  Negative for nausea and vomiting.   Endocrine: Negative for cold intolerance.   Musculoskeletal:  Negative for arthralgias.   Skin:  Negative for color change and rash.   Allergic/Immunologic: Negative for environmental allergies.   Neurological:  Negative for headaches.   Hematological:  Negative for adenopathy.   Psychiatric/Behavioral:  Negative for agitation, behavioral problems, confusion, decreased concentration, dysphoric mood, hallucinations, self-injury, sleep disturbance and suicidal ideas. The patient is not nervous/anxious and is not hyperactive.      Current Outpatient Medications on File Prior to Visit  "  Medication Sig Dispense Refill    amphetamine-dextroamphetamine (ADDERALL XR, 20MG,) 20 MG 24 hr capsule Take 1 capsule (20 mg total) by mouth every morning Max Daily Amount: 20 mg 30 capsule 0     No current facility-administered medications on file prior to visit.      Social History     Tobacco Use    Smoking status: Never    Smokeless tobacco: Never   Vaping Use    Vaping status: Never Used   Substance and Sexual Activity    Alcohol use: No    Drug use: No    Sexual activity: Yes     Partners: Female         Objective     /73 (BP Location: Left arm, Patient Position: Sitting, Cuff Size: Adult)   Pulse 70   Temp (!) 97.2 °F (36.2 °C) (Temporal)   Resp 16   Ht 6' 1\" (1.854 m)   Wt 112 kg (246 lb)   SpO2 100%   BMI 32.46 kg/m²     Physical Exam  Vitals and nursing note reviewed.   Constitutional:       General: He is not in acute distress.     Appearance: Normal appearance. He is not ill-appearing, toxic-appearing or diaphoretic.   HENT:      Head: Normocephalic and atraumatic.      Mouth/Throat:      Mouth: Mucous membranes are moist.      Pharynx: Oropharynx is clear.   Eyes:      Pupils: Pupils are equal, round, and reactive to light.   Cardiovascular:      Rate and Rhythm: Normal rate and regular rhythm.      Pulses: Normal pulses.      Heart sounds: Normal heart sounds.   Pulmonary:      Effort: Pulmonary effort is normal. No respiratory distress.      Breath sounds: Normal breath sounds.   Abdominal:      General: Bowel sounds are normal.   Musculoskeletal:         General: No swelling. Normal range of motion.      Cervical back: Normal range of motion.   Skin:     General: Skin is warm.      Capillary Refill: Capillary refill takes less than 2 seconds.      Comments: Common wart RLE   Neurological:      General: No focal deficit present.      Mental Status: He is alert and oriented to person, place, and time.   Psychiatric:         Mood and Affect: Mood normal.         Behavior: Behavior " normal.         Thought Content: Thought content normal.         Judgment: Judgment normal.

## 2024-11-01 NOTE — ASSESSMENT & PLAN NOTE
Pt reports improved symptoms on Adderall XR 20 mg qd, states performance at school has improved and able to focus. Continue current medication dose, pt has psych appt for today 11/1/24.

## 2024-11-01 NOTE — H&P
PSYCHIATRIC EVALUATION     [facility]    Name and Date of Birth:  Chad Cornejo 24 y.o. 2000 MRN: [id number]    Date of Visit: November 1, 2024    Reason for visit: Full psychiatric intake assessment for medication management     Virtual Visit Disclaimer:       TeleMed provider: Margot Ace D.O.    Location: [place]     Verification of patient location:     Patient is currently located in the state of [place]  Patient is currently located in a state in which I am licensed     After connecting through [employer], the patient was identified by name and date of birth.  Chad Cornejo was informed that this is a telemedicine visit that is being conducted through [employer], and the patient was informed that this is a secure, HIPAA-compliant platform. My office door was closed. No one else was in the room. Chad Cornejo acknowledged consent and understanding of privacy and security of the video platform. Chad understands that the online visit is based solely on information provided by the patient, and that, in the absence of a face-to-face physical evaluation by the physician, the diagnosis Chad  receives is both limited and provisional in terms of accuracy and completeness. Chad Cornejo understands that they can discontinue the visit at any time. I informed Chad that I have reviewed their record in EPIC and presented the opportunity for them to ask any questions regarding the visit today. Chad Cornejo voiced understanding and consented to these terms. Chad is aware this is a billable service. Chad is present in [place] ADHD, anxiety    HPI     Chad Cornejo is a 24 y.o. male with a past psychiatric history significant for  who presents to the [facility] outpatient clinic for intake assessment. Chad presents as a new patient for this physician    The patient, Chad, is a 24-year-old male who initially sought psychiatric consultation  for the purpose of obtaining a prescription for Adderall. However, he was able to get the medication from his family practice doctor. Despite this, he decided to continue with the psychiatric consultation as a support system in case of any future issues. He has been taking Adderall for about a month prior to this consultation. Chad has a history of anxiety, which he experiences occasionally. He has been in therapy before, first when he was 11 years old, initiated by his mother. He didn't understand the reason for therapy at that time and only attended for a month or two. He sought therapy again during his freshman year in college due to difficulties adjusting to the new environment and relationship issues. He found the counseling provided by the [place] helpful. He returned to therapy towards the end of his dominguez year in college. He was initially resistant to medication but became open to it after college. 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     Chad's mother has severe anxiety and a personality issue, which he believes has caused him and his brother a lot of trauma and anxiety. He grew up being the older brother to his intellectually disabled sibling, which he described as being young but also having to be old. He denied any history of physical, mental, or sexual abuse during his childhood.  Patient denies history of SI, HI, AVH, delusions, areli or self-harm. He has never been hospitalized on an inpatient psychiatric unit. He has been seeing his current therapist for two to three months and had a previous therapist about a year ago. Chad recently started nursing school, which has been a source of anxiety for him. He was worried about his ability to focus and manage his schedule while working a full-time job and taking science and psychology courses.  This is patient's first psychiatric appointment.    In terms of substance use, Chad  admitted to binge drinking in college but has significantly reduced his alcohol consumption since graduation. He also smoked marijuana in college but no longer does so. He takes magnesium, turmeric, vitamin D, multivitamin, and fish oil supplements daily. Chad's academic performance was good in elementary school but dropped in high school, which he attributed to poor mental health and difficulty fitting in with his large and diverse class. He graduated from college with a 2.95 GPA. He currently lives with his supportive parents and brother. 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 At the time of the consultation, Chad did not express any significant depression or anxiety that would warrant a discussion about medication for these conditions.     After discussion of risks, benefits, potential side effects, alternatives, we will decrease patient's Adderall XR from 20 mg every morning to 15 mg every morning because patient believes that it may last long enough to interfere with his sleep at a dose of 20 mg daily.  He denies acute mental complaints concerns at this time.        Current Rating Scores:     Current PHQ-9   PHQ-2/9 Depression Screening           Current JACKELIN-7 is   JACKELIN-7 Flowsheet Screening      Flowsheet Row Most Recent Value   Over the last two weeks, how often have you been bothered by the following problems?     Feeling nervous, anxious, or on edge 1    Not being able to stop or control worrying 0    Worrying too much about different things 0    Trouble relaxing  0    Being so restless that it's hard to sit still 1    Becoming easily annoyed or irritable  1    Feeling afraid as if something awful might happen 0    How difficult have these problems made it for you to do your work, take care of things at home, or get along with other people?  Not difficult at all    JACKELIN Score  3         .    Psychiatric Review Of Systems:    Sleep changes:  decreased  Appetite changes: no  Weight changes: no  Energy/anergy: no  Interest/pleasure/anhedonia: no  Somatic symptoms: no  Anxiety/panic: worrying  Claudia: no  Guilty/hopeless: no  Self injurious behavior/risky behavior: no  Suicidal ideation: no  Homicidal ideation: no  Auditory hallucinations: no  Visual hallucinations: no  Other hallucinations: no  Delusional thinking: no  Eating disorder history: unknown  Obsessive/compulsive symptoms: unknown    Review Of Systems:    Constitutional negative   ENT negative   Cardiovascular negative   Respiratory negative   Gastrointestinal negative   Genitourinary negative   Musculoskeletal negative   Integumentary negative   Neurological negative   Endocrine negative   Other Symptoms none, all other systems are negative       Family Psychiatric History:     Family History   Problem Relation Age of Onset    Anxiety disorder Mother     Cancer Mother     Hypertension Father     Depression Father     No Known Problems Sister     Cancer Brother     ADD / ADHD Brother     Cancer Maternal Grandmother     No Known Problems Maternal Grandfather     No Known Problems Paternal Grandmother     COPD Paternal Grandfather     Hypertension Paternal Grandfather     No Known Problems Maternal Aunt     No Known Problems Maternal Uncle     No Known Problems Paternal Aunt     No Known Problems Paternal Uncle     Arthritis Family     Cancer Family     Osteoporosis Family     Anesthesia problems Neg Hx     Clotting disorder Neg Hx     Collagen disease Neg Hx     Diabetes Neg Hx     Dislocations Neg Hx     Learning disabilities Neg Hx     Neurological problems Neg Hx     Rheumatologic disease Neg Hx     Scoliosis Neg Hx     Vascular Disease Neg Hx          Past Psychiatric History:     Inpatient psychiatric admissions: None    Prior outpatient psychiatric linkage: None    Past/current psychotherapy:   - Therapy at age 11, initiated by his mother, attended for a month or two.  - Therapy during  freshman year of college due to adjustment issues and relationship problems.  - Therapy towards the end of dominguez year in college.  - Currently seeing a therapist for two to three months.    History of suicidal attempts/thoughts/gestures: None    Psychotropic medication trials/experiences:   - Initially resistant to medication but became open to it after college.  - Currently taking Adderall, prescribed by his primary care physician.    Substance abuse inpatient/outpatient rehabilitation: None reported    Substance Abuse History:    No history of illict substance, or tobacco abuse. No past legal actions or arrests secondary to substance intoxication. The patient denies prior DWIs/DUIs. No history of outpatient/inpatient rehabilitation programs. Chad does not exhibit objective evidence of substance withdrawal during today's examination nor does Chad appear under the influence of any psychoactive substance.      - Binge drinking in college, significantly reduced alcohol consumption since graduation.  - Smoked marijuana in college, no longer does so.      Social History:    Early Developmental: No specific details provided.    Education:   - Good academic performance in elementary school.  - Grades dropped in high school due to poor mental health and difficulty fitting in.  - Graduated from college with a 2.95 GPA.    Marital history: Has a girlfriend, no children.    Living arrangement, social support: Lives with supportive parents and brother.    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  - Has been in his current position for two years and three months.  - Currently attending nursing school and working a full-time job.    Access to firearms: None reported    Traumatic History:     Abuse: Denied any history of physical, mental, or sexual abuse.    Other traumatic events: Grew up being the older brother to an intellectually disabled sibling, which he described as being young but also having  to be old.    Past Medical History:    No past medical history on file.     Past Surgical History:   Procedure Laterality Date    EYE SURGERY      HERNIA REPAIR      GA ESOPHAGOGASTRODUODENOSCOPY TRANSORAL DIAGNOSTIC N/A 4/24/2019    Procedure: ESOPHAGOGASTRODUODENOSCOPY (EGD);  Surgeon: Diaz Evans MD;  Location: [facility];  Service: Gastroenterology    WISDOM TOOTH EXTRACTION       Allergies   Allergen Reactions    Amoxicillin Hives    Cefzil [Cefprozil] Hives       History Review:    The following portions of the patient's history were reviewed and updated as appropriate: allergies, current medications, past family history, past medical history, past social history, past surgical history, and problem list.    OBJECTIVE:    Vital signs in last 24 hours:    There were no vitals filed for this visit.    Mental Status Evaluation:    Appearance age appropriate, casually dressed   Behavior cooperative, mildly anxious   Speech normal rate, normal volume, normal pitch   Mood normal   Affect normal range and intensity, appropriate   Thought Processes organized, goal directed   Associations intact associations   Thought Content no overt delusions   Perceptual Disturbances: no auditory hallucinations, no visual hallucinations   Abnormal Thoughts  Risk Potential Suicidal ideation - None at present  Homicidal ideation - None at present  Potential for aggression - Not at present   Orientation oriented to person, place, time/date, and situation   Memory recent and remote memory grossly intact   Consciousness alert and awake   Attention Span Concentration Span attention span and concentration are age appropriate   Intellect appears to be of average intelligence   Insight intact   Judgement intact   Muscle Strength and  Gait unable to assess today due to virtual visit   Motor Activity unable to assess today due to virtual visit   Language no difficulty naming common objects, no difficulty repeating a phrase, no difficulty  writing a sentence   Fund of Knowledge adequate knowledge of current events  adequate fund of knowledge regarding past history  adequate fund of knowledge regarding vocabulary    Pain none   Pain Scale 0       Laboratory Results: I have personally reviewed all pertinent laboratory/tests results    Recent Labs (last 2 months):   No visits with results within 2 Month(s) from this visit.   Latest known visit with results is:   Appointment on 08/02/2024   Component Date Value    Hemoglobin A1C 08/02/2024 5.4     EAG 08/02/2024 108     Cholesterol 08/02/2024 238 (H)     Triglycerides 08/02/2024 128     HDL, Direct 08/02/2024 68     LDL Calculated 08/02/2024 144 (H)     Non-HDL-Chol (CHOL-HDL) 08/02/2024 170        Suicide/Homicide Risk Assessment:    Risk of Harm to Self:  The following ratings are based on assessment at the time of the interview  Historical Risk Factors include: chronic psychiatric problems  Protective Factors: no current suicidal ideation, access to mental health treatment, compliant with medications, compliant with mental health treatment, having a desire to be alive, responsibilities and duties to others, stable living environment, stable job, strong relationships    Risk of Harm to Others:  The following ratings are based on assessment at the time of the interview  Historical Risk Factors include: history of substance use.  Protective Factors: no current homicidal ideation, access to mental health treatment, compliant with medications, compliant with mental health treatment, responsibilities and duties to others, safe and stable living environment    The following interventions are recommended: contracts for safety at present - agrees to go to ED if feeling unsafe, contracts for safety at present - agrees to call Crisis Intervention Service if feeling unsafe. Although patient's acute lethality risk is LOW, long-term/chronic lethality risk is mildly elevated given historical mental health diagoses.  However, at the current moment, Chad is future-oriented, forward-thinking, and demonstrates ability to act in a self-preserving manner as evidenced by volitionally presenting to the appointment today, seeking treatment. Additionally, Chad's responses suggest a will and desire to live. At this juncture, inpatient hospitalization is not currently warranted. To mitigate future risk, patient should adhere to treatment recommendations, avoid alcohol/illicit substance use, utilize community-based resources and familiar support, and prioritize mental health treatment.     DSM-V Diagnoses:     1.)  ADHD, unspecified  2.)   3.)     Assessment/Plan:     After discussion of risks, benefits, potential side effects, alternatives, we will decrease Adderall XR to 15 mg every morning to assess whether or not patient's insomnia improves at this dose.  He denies acute mental complaints or concerns at this time.      Today's Plan/Medical Decision Making:    Psychopharmacologically, I spoke at length with Chad about the bio-psycho-social approach to treatment and avenues for intervention. I stressed the importance of making better dietary choices, expanding exercise regimen, and reestablishing a sense of purpose and connectivity in life. I also emphasized the importance of establishing care with the primary care physician along with specialists relevant to their medical diagnoses to which the patient voiced understanding and agreement.        Treatment Recommendations/Precautions:        Aware of 24 hour and weekend coverage for urgent situations accessed by calling [facility] main practice number    Patient voiced understanding and agreement to call 911 or head to the nearest emergency room should they experience any physical decompensation whatsoever including but not limited to the red flag signs and symptoms of fevers, chills, chest pains, nausea, vomiting, dizziness, changes in vision, trouble breathing.   Patient was also offered the contact information of their local [facility] crisis hotline and voiced understanding and agreement to call it or [phone] or head to nearest emergency department immediately should they experience any mental health decompensation whatsoever including but not limited to SI, HI, increasing AVH, areli.     Medications Risks/Benefits:      Risks, Benefits And Possible Side Effects Of Medications:    Risks, benefits, and possible side effects of medications explained to Chad and he verbalizes understanding and agreement for treatment.    Controlled Medication Discussion:     Chad has been filling controlled prescriptions on time as prescribed according to [place] Prescription Drug Monitoring Program    Treatment Plan:    Completed and signed during the session: Not applicable - Treatment Plan to be completed by [facility] therapist      Visit Time    Visit Start Time: 9:00 AM  Visit Stop Time: 9:55 AM  Total Visit Duration:  55 minutes     The total visit duration detailed above includes: patient engagement, medication management, psychotherapy/counseling, discussion regarding treatment goals, documentation, review of past medical records, and coordination of care.      Note Share Disclaimer:     This note was not shared with the patient due to reasonable likelihood of causing patient harm    Margot Ace DO  11/01/24

## 2024-12-19 ENCOUNTER — TELEPHONE (OUTPATIENT)
Dept: PSYCHIATRY | Facility: CLINIC | Age: 24
End: 2024-12-19

## 2024-12-20 ENCOUNTER — TELEMEDICINE (OUTPATIENT)
Dept: PSYCHIATRY | Facility: CLINIC | Age: 24
End: 2024-12-20
Payer: COMMERCIAL

## 2024-12-20 DIAGNOSIS — F90.1 ADHD, PREDOMINANTLY HYPERACTIVE TYPE: Primary | ICD-10-CM

## 2024-12-20 PROCEDURE — 99213 OFFICE O/P EST LOW 20 MIN: CPT | Performed by: STUDENT IN AN ORGANIZED HEALTH CARE EDUCATION/TRAINING PROGRAM

## 2024-12-20 RX ORDER — DEXTROAMPHETAMINE SACCHARATE, AMPHETAMINE ASPARTATE, DEXTROAMPHETAMINE SULFATE AND AMPHETAMINE SULFATE 1.875; 1.875; 1.875; 1.875 MG/1; MG/1; MG/1; MG/1
7.5 TABLET ORAL DAILY
Qty: 30 TABLET | Refills: 0 | Status: SHIPPED | OUTPATIENT
Start: 2024-12-20 | End: 2025-01-10

## 2024-12-24 NOTE — PSYCH
MEDICATION MANAGEMENT NOTE        [facility]      Name and Date of Birth:  Chad Cornejo 24 y.o. 2000 MRN: [id number]    Date of Visit: December 24, 2024    Reason for Visit: Follow-up visit for medication management     Virtual Visit Disclaimer:       TeleMed provider: Margot Ace D.O.    Location: [place]     Verification of patient location:     Patient is currently located in the state [facility]  Patient is currently located in a state in which I am licensed     After connecting through [employer], the patient was identified by name and date of birth.  Chad Cornejo was informed that this is a telemedicine visit that is being conducted through [employer], and the patient was informed that this is a secure, HIPAA-compliant platform. My office door was closed. No one else was in the room. Chad Cornejo acknowledged consent and understanding of privacy and security of the video platform. Chad understands that the online visit is based solely on information provided by the patient, and that, in the absence of a face-to-face physical evaluation by the physician, the diagnosis Chad  receives is both limited and provisional in terms of accuracy and completeness. Chad Cornejo understands that they can discontinue the visit at any time. I informed Chad that I have reviewed their record in EPIC and presented the opportunity for them to ask any questions regarding the visit today. Chad Cornejo voiced understanding and consented to these terms. Chad is aware this is a billable service. Chad is present in [place]      SUBJECTIVE:    Chad Cornejo is a 24 y.o. male with past psychiatric history significant for ADHD who was personally seen and evaluated today at the [facility] outpatient clinic for follow-up and medication management. Chad denies SI, HI, AVH, delusions, areli since our last.  Patient states that he would like to continue  trialing lower dose of Adderall to see if this would alleviate his ADHD symptoms while simultaneously allowing for lower doses.  He denies acute mental complaints or concerns at this time and remains engaged to work, personal hobbies.        Current Rating Scores:     None completed today.    Review Of Systems:      Constitutional negative   ENT negative   Cardiovascular negative   Respiratory negative   Gastrointestinal negative   Genitourinary negative   Musculoskeletal negative   Integumentary negative   Neurological negative   Endocrine negative   Other Symptoms none, all other systems are negative       Past Psychiatric History: (unchanged information from previous note copied and italicized) - Information that is bolded has been updated.     See intake    Substance Abuse History: (unchanged information from previous note copied and italicized) - Information that is bolded has been updated.     See intake    Social History: (unchanged information from previous note copied and italicized) - Information that is bolded has been updated.     See intake    Traumatic History: (unchanged information from previous note copied and italicized) - Information that is bolded has been updated.     See intake      Past Medical History:    No past medical history on file.     Past Surgical History:   Procedure Laterality Date    EYE SURGERY      HERNIA REPAIR      IA ESOPHAGOGASTRODUODENOSCOPY TRANSORAL DIAGNOSTIC N/A 4/24/2019    Procedure: ESOPHAGOGASTRODUODENOSCOPY (EGD);  Surgeon: Diaz Evans MD;  Location: [facility];  Service: Gastroenterology    WISDOM TOOTH EXTRACTION       Allergies   Allergen Reactions    Amoxicillin Hives    Cefzil [Cefprozil] Hives       Substance Abuse History:    Social History     Substance and Sexual Activity   Alcohol Use No     Social History     Substance and Sexual Activity   Drug Use No       Social History:    Social History     Socioeconomic History    Marital status: Single      Spouse name: Not on file    Number of children: Not on file    Years of education: Not on file    Highest education level: Not on file   Occupational History    Not on file   Tobacco Use    Smoking status: Never    Smokeless tobacco: Never   Vaping Use    Vaping status: Never Used   Substance and Sexual Activity    Alcohol use: No    Drug use: No    Sexual activity: Yes     Partners: Female   Other Topics Concern    Not on file   Social History Narrative    Not on file     Social Drivers of Health     Financial Resource Strain: Low Risk  (5/16/2023)    [facility]    Overall Financial Resource Strain (CARDIA)     Difficulty of Paying Living Expenses: Not hard at all   Food Insecurity: No Food Insecurity (5/16/2023)    [facility]    Hunger Vital Sign     Worried About Running Out of Food in the Last Year: Never true     Ran Out of Food in the Last Year: Never true   Transportation Needs: No Transportation Needs (5/16/2023)    [facility]    PRAPARE - Transportation     Lack of Transportation (Medical): No     Lack of Transportation (Non-Medical): No   Physical Activity: Sufficiently Active (5/16/2023)    Received from [facility]    Exercise Vital Sign     Days of Exercise per Week: 7 days     Minutes of Exercise per Session: 120 min   Stress: No Stress Concern Present (5/16/2023)    [facility]    [employer] [place] [employer] - Occupational Stress Questionnaire     Feeling of Stress : Not at all   Social Connections: Unknown (5/16/2023)    [facility]    Social Connection and Isolation Panel [NHANES]     Frequency of Communication with Friends and Family: More than three times a week      Frequency of Social Gatherings with Friends and Family: More than three times a week     Attends Sabianism Services: Patient declined     Active Member of Clubs or Organizations: Patient declined     Attends Club or Organization Meetings: Patient declined     Marital Status: Never    Intimate Partner Violence: Not At Risk (5/16/2023)    [facility]    Humiliation, Afraid, Rape, and Kick questionnaire     Fear of Current or Ex-Partner: No     Emotionally Abused: No     Physically Abused: No     Sexually Abused: No   Housing Stability: Low Risk  (5/16/2023)    [facility]    Housing Stability Vital Sign     Unable to Pay for Housing in the Last Year: No     Number of Places Lived in the Last Year: 1     Unstable Housing in the Last Year: No       Family Psychiatric History:     Family History   Problem Relation Age of Onset    Anxiety disorder Mother     Cancer Mother     Hypertension Father     Depression Father     No Known Problems Sister     Cancer Brother     ADD / ADHD Brother     Cancer Maternal Grandmother     No Known Problems Maternal Grandfather     No Known Problems Paternal Grandmother     COPD Paternal Grandfather     Hypertension Paternal Grandfather     No Known Problems Maternal Aunt     No Known Problems Maternal Uncle     No Known Problems Paternal Aunt     No Known Problems Paternal Uncle     Arthritis Family     Cancer Family     Osteoporosis Family     Anesthesia problems Neg Hx     Clotting disorder Neg Hx     Collagen disease Neg Hx     Diabetes Neg Hx     Dislocations Neg Hx     Learning disabilities Neg Hx     Neurological problems Neg Hx     Rheumatologic disease Neg Hx     Scoliosis Neg Hx     Vascular Disease Neg Hx        History Review: The following portions of the patient's history were reviewed and updated as appropriate: allergies, current medications, past  family history, past medical history, past social history, past surgical history, and problem list.         OBJECTIVE:     Vital signs in last 24 hours:    There were no vitals filed for this visit.    Mental Status Evaluation:    Appearance age appropriate, casually dressed   Behavior cooperative, mildly anxious   Speech normal rate, normal volume, normal pitch   Mood normal   Affect constricted   Thought Processes organized, goal directed   Associations intact associations   Thought Content no overt delusions   Perceptual Disturbances: no auditory hallucinations, no visual hallucinations   Abnormal Thoughts  Risk Potential Suicidal ideation - None at present  Homicidal ideation - None at present  Potential for aggression - Not at present   Orientation oriented to person, place, time/date, and situation   Memory recent and remote memory grossly intact   Consciousness alert and awake   Attention Span Concentration Span attention span and concentration appear shorter than expected for age   Intellect appears to be of average intelligence   Insight intact   Judgement intact   Muscle Strength and  Gait unable to assess today due to virtual visit   Motor activity unable to assess today due to virtual visit   Language no difficulty naming common objects, no difficulty repeating a phrase   Fund of Knowledge adequate knowledge of current events  adequate fund of knowledge regarding past history  adequate fund of knowledge regarding vocabulary    Pain none   Pain Scale Did not ask patient to formally rate       Laboratory Results: I have personally reviewed all pertinent laboratory/tests results    Recent Labs (last 2 months):   No visits with results within 2 Month(s) from this visit.   Latest known visit with results is:   Appointment on 08/02/2024   Component Date Value    Hemoglobin A1C 08/02/2024 5.4     EAG 08/02/2024 108     Cholesterol 08/02/2024 238 (H)     Triglycerides 08/02/2024 128     HDL, Direct 08/02/2024 68      LDL Calculated 08/02/2024 144 (H)     Non-HDL-Chol (CHOL-HDL) 08/02/2024 170        Suicide/Homicide Risk Assessment:    Risk of Harm to Self:  The following ratings are based on assessment at the time of the interview  Historical Risk Factors include: chronic psychiatric problems  Protective Factors: no current suicidal ideation, access to mental health treatment, compliant with medications, compliant with mental health treatment, having a desire to be alive, responsibilities and duties to others, strong relationships    Risk of Harm to Others:  The following ratings are based on assessment at the time of the interview  Historical Risk Factors include: none.  Protective Factors: no current homicidal ideation, access to mental health treatment, compliant with medications, compliant with mental health treatment, responsibilities and duties to others    The following interventions are recommended: continue medication management, contracts for safety at present - agrees to go to ED if feeling unsafe, contracts for safety at present - agrees to call Crisis Intervention Service if feeling unsafe      Lethality Statement:    Based on today's assessment and clinical criteria, Chad ARRIAZA Wilson contracts for safety and is not an imminent risk of harm to self or others. Outpatient level of care is deemed appropriate at this current time. Chad understands that if they can no longer contract for safety, they need to call the office or report to their nearest Emergency Room for immediate evaluation. They voiced understanding and agreement to call 911 or head to the nearest ED should they have any physical or mental decompensation whatsoever.       Assessment/Plan:     1.)  ADHD, unspecified  2.)  JACKELIN  3.)      After discussion of risks, benefits, potential side effects, alternatives, we will decrease Adderall to 7.5 mg every morning to see if this dosage alleviates patient's ADHD while at the same time not lasting as long  throughout the day when he does not need it.  Future directions may include discussing any anxiety that patient may be feeling though he denies acute mental complaints or concerns at this time.      Aware of 24 hour and weekend coverage for urgent situations accessed by calling [facility] main practice number    Medications Risks/Benefits      Risks, Benefits And Possible Side Effects Of Medications:    Risks, benefits, and possible side effects of medications explained to Chad and he verbalizes understanding and agreement for treatment.    Controlled Medication Discussion:     Chad has been filling controlled prescriptions on time as prescribed according to [place] Prescription Drug Monitoring Program    Psychotherapy Provided:     Individual psychotherapy provided: Crisis/safety plan discussed with Chad.     Treatment Plan:    Completed and signed during the session: Not applicable - Treatment Plan not due at this session      Visit Time    Visit Start Time: 1:00 PM  Visit Stop Time: 1:20 PM  Total Visit Duration:  20 minutes     The total visit duration detailed above includes: patient engagement, medication management, psychotherapy/counseling, discussion regarding treatment goals, documentation, review of past medical records, and coordination of care.      Note Share Disclaimer:     This note was not shared with the patient due to reasonable likelihood of causing patient harm      Margot Ace DO  Psychiatry  12/24/24

## 2024-12-31 ENCOUNTER — TELEPHONE (OUTPATIENT)
Dept: PSYCHIATRY | Facility: CLINIC | Age: 24
End: 2024-12-31

## 2025-01-03 ENCOUNTER — TELEPHONE (OUTPATIENT)
Age: 25
End: 2025-01-03

## 2025-01-10 ENCOUNTER — TELEMEDICINE (OUTPATIENT)
Dept: PSYCHIATRY | Facility: CLINIC | Age: 25
End: 2025-01-10
Payer: COMMERCIAL

## 2025-01-10 DIAGNOSIS — F41.1 GENERALIZED ANXIETY DISORDER: ICD-10-CM

## 2025-01-10 DIAGNOSIS — F90.1 ADHD, PREDOMINANTLY HYPERACTIVE TYPE: Primary | ICD-10-CM

## 2025-01-10 PROCEDURE — 99214 OFFICE O/P EST MOD 30 MIN: CPT | Performed by: STUDENT IN AN ORGANIZED HEALTH CARE EDUCATION/TRAINING PROGRAM

## 2025-01-10 RX ORDER — DEXTROAMPHETAMINE SACCHARATE, AMPHETAMINE ASPARTATE, DEXTROAMPHETAMINE SULFATE AND AMPHETAMINE SULFATE 1.875; 1.875; 1.875; 1.875 MG/1; MG/1; MG/1; MG/1
7.5 TABLET ORAL DAILY
Qty: 21 TABLET | Refills: 0 | Status: SHIPPED | OUTPATIENT
Start: 2025-01-10 | End: 2025-01-31

## 2025-01-14 NOTE — PSYCH
MEDICATION MANAGEMENT NOTE        [facility] [place] [facility]      Name and Date of Birth:  Chad Cornejo 24 y.o. 2000 MRN: [id number]    Date of Visit: January 14, 2025    Reason for Visit: Follow-up visit for medication management     Virtual Visit Disclaimer:       TeleMed provider: Margot Ace D.O.    Location: [place]     Verification of patient location:     Patient is currently located in the [facility]  Patient is currently located in a state in which I am licensed     After connecting through [employer], the patient was identified by name and date of birth.  Chad Cornejo was informed that this is a telemedicine visit that is being conducted through [employer], and the patient was informed that this is a secure, HIPAA-compliant platform. My office door was closed. No one else was in the room. Chad Cornejo acknowledged consent and understanding of privacy and security of the video platform. Chad understands that the online visit is based solely on information provided by the patient, and that, in the absence of a face-to-face physical evaluation by the physician, the diagnosis Chad  receives is both limited and provisional in terms of accuracy and completeness. Chad Cornejo understands that they can discontinue the visit at any time. I informed Chad that I have reviewed their record in EPIC and presented the opportunity for them to ask any questions regarding the visit today. Chad Cornejo voiced understanding and consented to these terms. Chad is aware this is a billable service. Chad is present in [place]      SUBJECTIVE:    Chad Cornejo is a 24 y.o. male with past psychiatric history significant for ADHD who was personally seen and evaluated today at the [facility] outpatient clinic for follow-up and medication management. Chad denies SI, HI, AVH, delusions, areli since our last.  He did note that he had not picked up his new  dose of Adderall because he wanted more time to contemplate risks benefits of a new dosage however states that he is not ready to trial 7.5 mg Adderall every morning for his focus issues.  He denies acute mental complaints or concerns at this time      Current Rating Scores:     None completed today.    Review Of Systems:      Constitutional negative   ENT negative   Cardiovascular negative   Respiratory negative   Gastrointestinal negative   Genitourinary negative   Musculoskeletal negative   Integumentary negative   Neurological negative   Endocrine negative   Other Symptoms none, all other systems are negative       Past Psychiatric History: (unchanged information from previous note copied and italicized) - Information that is bolded has been updated.     See intake    Substance Abuse History: (unchanged information from previous note copied and italicized) - Information that is bolded has been updated.     See intake    Social History: (unchanged information from previous note copied and italicized) - Information that is bolded has been updated.     See intake    Traumatic History: (unchanged information from previous note copied and italicized) - Information that is bolded has been updated.     See intake      Past Medical History:    No past medical history on file.     Past Surgical History:   Procedure Laterality Date    EYE SURGERY      HERNIA REPAIR      LA ESOPHAGOGASTRODUODENOSCOPY TRANSORAL DIAGNOSTIC N/A 4/24/2019    Procedure: ESOPHAGOGASTRODUODENOSCOPY (EGD);  Surgeon: Diaz Evans MD;  Location: [facility];  Service: Gastroenterology    WISDOM TOOTH EXTRACTION       Allergies   Allergen Reactions    Amoxicillin Hives    Cefzil [Cefprozil] Hives       Substance Abuse History:    Social History     Substance and Sexual Activity   Alcohol Use No     Social History     Substance and Sexual Activity   Drug Use No       Social History:    Social History     Socioeconomic History    Marital status:  Single     Spouse name: Not on file    Number of children: Not on file    Years of education: Not on file    Highest education level: Not on file   Occupational History    Not on file   Tobacco Use    Smoking status: Never    Smokeless tobacco: Never   Vaping Use    Vaping status: Never Used   Substance and Sexual Activity    Alcohol use: No    Drug use: No    Sexual activity: Yes     Partners: Female   Other Topics Concern    Not on file   Social History Narrative    Not on file     Social Drivers of Health     Financial Resource Strain: Low Risk  (5/16/2023)    [facility]    Overall Financial Resource Strain (CARDIA)     Difficulty of Paying Living Expenses: Not hard at all   Food Insecurity: No Food Insecurity (5/16/2023)    [facility]    Hunger Vital Sign     Worried About Running Out of Food in the Last Year: Never true     Ran Out of Food in the Last Year: Never true   Transportation Needs: No Transportation Needs (5/16/2023)    Received from [facility], [facility]    PRAPARE - Transportation     Lack of Transportation (Medical): No     Lack of Transportation (Non-Medical): No   Physical Activity: Sufficiently Active (5/16/2023)    Received from [facility]    Exercise Vital Sign     Days of Exercise per Week: 7 days     Minutes of Exercise per Session: 120 min   Stress: No Stress Concern Present (5/16/2023)    [facility] from [facility]    [employer] - Occupational Stress Questionnaire     Feeling of Stress : Not at all   Social Connections: Unknown (5/16/2023)    Received from [facility]    Social Connection and Isolation Panel [NHANES]     Frequency of Communication with Friends and Family: More than three times  a week     Frequency of Social Gatherings with Friends and Family: More than three times a week     Attends Jainism Services: Patient declined     Active Member of Clubs or Organizations: Patient declined     Attends Club or Organization Meetings: Patient declined     Marital Status: Never    Intimate Partner Violence: Not At Risk (5/16/2023)    [facility]    Humiliation, Afraid, Rape, and Kick questionnaire     Fear of Current or Ex-Partner: No     Emotionally Abused: No     Physically Abused: No     Sexually Abused: No   Housing Stability: Low Risk  (5/16/2023)    [facility]    Housing Stability Vital Sign     Unable to Pay for Housing in the Last Year: No     Number of Places Lived in the Last Year: 1     Unstable Housing in the Last Year: No       Family Psychiatric History:     Family History   Problem Relation Age of Onset    Anxiety disorder Mother     Cancer Mother     Hypertension Father     Depression Father     No Known Problems Sister     Cancer Brother     ADD / ADHD Brother     Cancer Maternal Grandmother     No Known Problems Maternal Grandfather     No Known Problems Paternal Grandmother     COPD Paternal Grandfather     Hypertension Paternal Grandfather     No Known Problems Maternal Aunt     No Known Problems Maternal Uncle     No Known Problems Paternal Aunt     No Known Problems Paternal Uncle     Arthritis Family     Cancer Family     Osteoporosis Family     Anesthesia problems Neg Hx     Clotting disorder Neg Hx     Collagen disease Neg Hx     Diabetes Neg Hx     Dislocations Neg Hx     Learning disabilities Neg Hx     Neurological problems Neg Hx     Rheumatologic disease Neg Hx     Scoliosis Neg Hx     Vascular Disease Neg Hx        History Review: The following portions of the patient's history were reviewed and updated as appropriate: allergies, current  medications, past family history, past medical history, past social history, past surgical history, and problem list.         OBJECTIVE:     Vital signs in last 24 hours:    There were no vitals filed for this visit.    Mental Status Evaluation:    Appearance age appropriate, casually dressed   Behavior cooperative, mildly anxious   Speech normal rate, normal volume, normal pitch   Mood normal   Affect constricted   Thought Processes organized, goal directed   Associations intact associations   Thought Content no overt delusions   Perceptual Disturbances: no auditory hallucinations, no visual hallucinations   Abnormal Thoughts  Risk Potential Suicidal ideation - None at present  Homicidal ideation - None at present  Potential for aggression - Not at present   Orientation oriented to person, place, time/date, and situation   Memory recent and remote memory grossly intact   Consciousness alert and awake   Attention Span Concentration Span attention span and concentration appear shorter than expected for age   Intellect appears to be of average intelligence   Insight intact   Judgement intact   Muscle Strength and  Gait unable to assess today due to virtual visit   Motor activity unable to assess today due to virtual visit   Language no difficulty naming common objects, no difficulty repeating a phrase   Fund of Knowledge adequate knowledge of current events  adequate fund of knowledge regarding past history  adequate fund of knowledge regarding vocabulary    Pain none   Pain Scale Did not ask patient to formally rate       Laboratory Results: I have personally reviewed all pertinent laboratory/tests results    Recent Labs (last 2 months):   No visits with results within 2 Month(s) from this visit.   Latest known visit with results is:   Appointment on 08/02/2024   Component Date Value    Hemoglobin A1C 08/02/2024 5.4     EAG 08/02/2024 108     Cholesterol 08/02/2024 238 (H)     Triglycerides 08/02/2024 128     HDL,  Direct 08/02/2024 68     LDL Calculated 08/02/2024 144 (H)     Non-HDL-Chol (CHOL-HDL) 08/02/2024 170        Suicide/Homicide Risk Assessment:    Risk of Harm to Self:  The following ratings are based on assessment at the time of the interview  Historical Risk Factors include: chronic psychiatric problems  Protective Factors: no current suicidal ideation, access to mental health treatment, compliant with medications, compliant with mental health treatment, having a desire to be alive, responsibilities and duties to others, strong relationships    Risk of Harm to Others:  The following ratings are based on assessment at the time of the interview  Historical Risk Factors include: none.  Protective Factors: no current homicidal ideation, access to mental health treatment, compliant with medications, compliant with mental health treatment, responsibilities and duties to others    The following interventions are recommended: continue medication management, contracts for safety at present - agrees to go to ED if feeling unsafe, contracts for safety at present - agrees to call Crisis Intervention Service if feeling unsafe      Lethality Statement:    Based on today's assessment and clinical criteria, Chad ARRIAZA Clemente contracts for safety and is not an imminent risk of harm to self or others. Outpatient level of care is deemed appropriate at this current time. Chad understands that if they can no longer contract for safety, they need to call the office or report to their nearest Emergency Room for immediate evaluation. They voiced understanding and agreement to call 911 or head to the nearest ED should they have any physical or mental decompensation whatsoever.       Assessment/Plan:     1.)  ADHD, unspecified  2.)  JACKELIN  3.)      After discussion of risks, benefits, potential side effects, alternatives, we will continue Adderall  7.5 mg every morning.  Future directions may include addressing patient's chronic anxiety as  well.  He denies acute mental complaints or concerns at this time      Aware of 24 hour and weekend coverage for urgent situations accessed by calling [facility] main practice number    Medications Risks/Benefits      Risks, Benefits And Possible Side Effects Of Medications:    Risks, benefits, and possible side effects of medications explained to Chad and he verbalizes understanding and agreement for treatment.    Controlled Medication Discussion:     Chad has been filling controlled prescriptions on time as prescribed according to [place] Prescription Drug Monitoring Program    Psychotherapy Provided:     Individual psychotherapy provided: Crisis/safety plan discussed with Chad.     Treatment Plan:    Completed and signed during the session: Not applicable - Treatment Plan not due at this session      Visit Time    Visit Start Time: 8:30 AM  Visit Stop Time: 8:50 AM  Total Visit Duration:  20 minutes     The total visit duration detailed above includes: patient engagement, medication management, psychotherapy/counseling, discussion regarding treatment goals, documentation, review of past medical records, and coordination of care.      Note Share Disclaimer:     This note was not shared with the patient due to reasonable likelihood of causing patient harm      Margot Ace DO  Psychiatry  01/14/25

## 2025-01-17 ENCOUNTER — TELEPHONE (OUTPATIENT)
Dept: PSYCHIATRY | Facility: CLINIC | Age: 25
End: 2025-01-17

## 2025-01-23 ENCOUNTER — TELEPHONE (OUTPATIENT)
Age: 25
End: 2025-01-23

## 2025-01-23 NOTE — TELEPHONE ENCOUNTER
Patient called in to schedule a follow up appointment with the provider.    Writer established the appointment for 1.30.25 @ 11:00am.    Writer verified the patients insurance and copay. Patient was advised to check into My Chart 15 minutes prior.

## 2025-01-30 ENCOUNTER — TELEPHONE (OUTPATIENT)
Age: 25
End: 2025-01-30

## 2025-01-30 ENCOUNTER — TELEMEDICINE (OUTPATIENT)
Dept: PSYCHIATRY | Facility: CLINIC | Age: 25
End: 2025-01-30
Payer: COMMERCIAL

## 2025-01-30 ENCOUNTER — DOCUMENTATION (OUTPATIENT)
Dept: PSYCHIATRY | Facility: CLINIC | Age: 25
End: 2025-01-30

## 2025-01-30 ENCOUNTER — TELEPHONE (OUTPATIENT)
Dept: PSYCHIATRY | Facility: CLINIC | Age: 25
End: 2025-01-30

## 2025-01-30 DIAGNOSIS — F90.1 ADHD, PREDOMINANTLY HYPERACTIVE TYPE: Primary | ICD-10-CM

## 2025-01-30 DIAGNOSIS — F41.1 GENERALIZED ANXIETY DISORDER: ICD-10-CM

## 2025-01-30 DIAGNOSIS — F41.9 ANXIETY: Primary | ICD-10-CM

## 2025-01-30 PROCEDURE — 99214 OFFICE O/P EST MOD 30 MIN: CPT | Performed by: STUDENT IN AN ORGANIZED HEALTH CARE EDUCATION/TRAINING PROGRAM

## 2025-01-30 RX ORDER — DEXTROAMPHETAMINE SACCHARATE, AMPHETAMINE ASPARTATE MONOHYDRATE, DEXTROAMPHETAMINE SULFATE AND AMPHETAMINE SULFATE 3.75; 3.75; 3.75; 3.75 MG/1; MG/1; MG/1; MG/1
15 CAPSULE, EXTENDED RELEASE ORAL EVERY MORNING
Qty: 14 CAPSULE | Refills: 0 | Status: SHIPPED | OUTPATIENT
Start: 2025-01-30

## 2025-01-30 NOTE — TELEPHONE ENCOUNTER
Patient contacted the office to schedule a follow up visit with provider. Patient is now scheduled for 2/14/25  at 11:30 am virtually.

## 2025-01-30 NOTE — PROGRESS NOTES
Psychotherapy Discharge Summary    Preferred Name: Chad Cornejo  YOB: 2000    Admission date to psychotherapy: 8/23/2024    Referred by: Psychiatrist    Presenting Problem: Increased anxiety    Course of treatment included : individual therapy     Progress/Outcome of Treatment Goals (brief summary of course of treatment): Clinician met with Chad once after completing initial assessment. Was not able to observe progress.     Treatment Complications (if any): N/A    Treatment Progress:  N/A    Current [facility] Psychiatric Provider: Margot Ace DO    Discharge Medications include: Adderall     Discharge Date: 1/30/2025    Discharge Diagnosis:   1. Anxiety            Criteria for Discharge:  Did not respond to interest letter.     Aftercare recommendations include (include specific referral names and phone numbers, if appropriate): N/A    Prognosis:  N/A

## 2025-02-05 NOTE — PSYCH
MEDICATION MANAGEMENT NOTE        [facility]      Name and Date of Birth:  Chad Cornejo 24 y.o. 2000 MRN: [id number]    Date of Visit: February 5, 2025    Reason for Visit: Follow-up visit for medication management     Virtual Visit Disclaimer:       TeleMed provider: Margot Ace D.O.    Location: [place]     Verification of patient location:     Patient is currently located in the state [facility]  Patient is currently located in a state in which I am licensed     After connecting through [employer], the patient was identified by name and date of birth.  Chad Cornejo was informed that this is a telemedicine visit that is being conducted through [employer], and the patient was informed that this is a secure, HIPAA-compliant platform. My office door was closed. No one else was in the room. Chad Cornejo acknowledged consent and understanding of privacy and security of the video platform. Chad understands that the online visit is based solely on information provided by the patient, and that, in the absence of a face-to-face physical evaluation by the physician, the diagnosis Chad  receives is both limited and provisional in terms of accuracy and completeness. Chad Cornejo understands that they can discontinue the visit at any time. I informed Chad that I have reviewed their record in EPIC and presented the opportunity for them to ask any questions regarding the visit today. Chad Cornejo voiced understanding and consented to these terms. Chad is aware this is a billable service. Chad is present in [place]      SUBJECTIVE:    Chad Cornejo is a 24 y.o. male with past psychiatric history significant for ADHD who was personally seen and evaluated today at the [facility] outpatient clinic for follow-up and medication management. Chad denies SI, HI, AVH, delusions, areli since our last.  After discussion of risks, benefits, potential  side effects, alternatives, he states that Adderall XR 15 mg every morning worked better for him than these other trials of various doses and we will continue on that dose.  He denies acute mental health complaints concerns at this time      Current Rating Scores:     None completed today.    Review Of Systems:      Constitutional negative   ENT negative   Cardiovascular negative   Respiratory negative   Gastrointestinal negative   Genitourinary negative   Musculoskeletal negative   Integumentary negative   Neurological negative   Endocrine negative   Other Symptoms none, all other systems are negative       Past Psychiatric History: (unchanged information from previous note copied and italicized) - Information that is bolded has been updated.     See intake    Substance Abuse History: (unchanged information from previous note copied and italicized) - Information that is bolded has been updated.     See intake    Social History: (unchanged information from previous note copied and italicized) - Information that is bolded has been updated.     See intake    Traumatic History: (unchanged information from previous note copied and italicized) - Information that is bolded has been updated.     See intake      Past Medical History:    No past medical history on file.     Past Surgical History:   Procedure Laterality Date    EYE SURGERY      HERNIA REPAIR      [place] ESOPHAGOGASTRODUODENOSCOPY TRANSORAL DIAGNOSTIC N/A 4/24/2019    Procedure: ESOPHAGOGASTRODUODENOSCOPY (EGD);  Surgeon: Diaz Evans MD;  Location: [facility];  Service: Gastroenterology    WISDOM TOOTH EXTRACTION       Allergies   Allergen Reactions    Amoxicillin Hives    Cefzil [Cefprozil] Hives       Substance Abuse History:    Social History     Substance and Sexual Activity   Alcohol Use No     Social History     Substance and Sexual Activity   Drug Use No       Social History:    Social History     Socioeconomic History    Marital status: Single      Spouse name: Not on file    Number of children: Not on file    Years of education: Not on file    Highest education level: Not on file   Occupational History    Not on file   Tobacco Use    Smoking status: Never    Smokeless tobacco: Never   Vaping Use    Vaping status: Never Used   Substance and Sexual Activity    Alcohol use: No    Drug use: No    Sexual activity: Yes     Partners: Female   Other Topics Concern    Not on file   Social History Narrative    Not on file     Social Drivers of Health     Financial Resource Strain: Low Risk  (5/16/2023)    [facility]    Overall Financial Resource Strain (CARDIA)     Difficulty of Paying Living Expenses: Not hard at all   Food Insecurity: No Food Insecurity (5/16/2023)    [facility]    Hunger Vital Sign     Worried About Running Out of Food in the Last Year: Never true     Ran Out of Food in the Last Year: Never true   Transportation Needs: No Transportation Needs (5/16/2023)    [facility]    PRAPARE - Transportation     Lack of Transportation (Medical): No     Lack of Transportation (Non-Medical): No   Physical Activity: Sufficiently Active (5/16/2023)    Received from [facility]    Exercise Vital Sign     Days of Exercise per Week: 7 days     Minutes of Exercise per Session: 120 min   Stress: No Stress Concern Present (5/16/2023)    [facility]    [employer] - Occupational Stress Questionnaire     Feeling of Stress : Not at all   Social Connections: Unknown (5/16/2023)    [facility]    Social Connection and Isolation Panel [NHANES]     Frequency of Communication with Friends and Family: More than three times a week      Frequency of Social Gatherings with Friends and Family: More than three times a week     Attends Religion Services: Patient declined     Active Member of Clubs or Organizations: Patient declined     Attends Club or Organization Meetings: Patient declined     Marital Status: Never    Intimate Partner Violence: Not At Risk (5/16/2023)    [facility]    Humiliation, Afraid, Rape, and Kick questionnaire     Fear of Current or Ex-Partner: No     Emotionally Abused: No     Physically Abused: No     Sexually Abused: No   Housing Stability: Low Risk  (5/16/2023)    [facility]    Housing Stability Vital Sign     Unable to Pay for Housing in the Last Year: No     Number of Places Lived in the Last Year: 1     Unstable Housing in the Last Year: No       Family Psychiatric History:     Family History   Problem Relation Age of Onset    Anxiety disorder Mother     Cancer Mother     Hypertension Father     Depression Father     No Known Problems Sister     Cancer Brother     ADD / ADHD Brother     Cancer Maternal Grandmother     No Known Problems Maternal Grandfather     No Known Problems Paternal Grandmother     COPD Paternal Grandfather     Hypertension Paternal Grandfather     No Known Problems Maternal Aunt     No Known Problems Maternal Uncle     No Known Problems Paternal Aunt     No Known Problems Paternal Uncle     Arthritis Family     Cancer Family     Osteoporosis Family     Anesthesia problems Neg Hx     Clotting disorder Neg Hx     Collagen disease Neg Hx     Diabetes Neg Hx     Dislocations Neg Hx     Learning disabilities Neg Hx     Neurological problems Neg Hx     Rheumatologic disease Neg Hx     Scoliosis Neg Hx     Vascular Disease Neg Hx        History Review: The following portions of the patient's history were reviewed and updated as appropriate: allergies, current medications, past  family history, past medical history, past social history, past surgical history, and problem list.         OBJECTIVE:     Vital signs in last 24 hours:    There were no vitals filed for this visit.    Mental Status Evaluation:    Appearance age appropriate, casually dressed   Behavior cooperative, mildly anxious   Speech normal rate, normal volume, normal pitch   Mood normal   Affect constricted   Thought Processes organized, goal directed   Associations intact associations   Thought Content no overt delusions   Perceptual Disturbances: no auditory hallucinations, no visual hallucinations   Abnormal Thoughts  Risk Potential Suicidal ideation - None at present  Homicidal ideation - None at present  Potential for aggression - Not at present   Orientation oriented to person, place, time/date, and situation   Memory recent and remote memory grossly intact   Consciousness alert and awake   Attention Span Concentration Span attention span and concentration appear shorter than expected for age   Intellect appears to be of average intelligence   Insight intact   Judgement intact   Muscle Strength and  Gait unable to assess today due to virtual visit   Motor activity unable to assess today due to virtual visit   Language no difficulty naming common objects, no difficulty repeating a phrase   Fund of Knowledge adequate knowledge of current events  adequate fund of knowledge regarding past history  adequate fund of knowledge regarding vocabulary    Pain none   Pain Scale Did not ask patient to formally rate       Laboratory Results: I have personally reviewed all pertinent laboratory/tests results    Recent Labs (last 2 months):   No visits with results within 2 Month(s) from this visit.   Latest known visit with results is:   Appointment on 08/02/2024   Component Date Value    Hemoglobin A1C 08/02/2024 5.4     EAG 08/02/2024 108     Cholesterol 08/02/2024 238 (H)     Triglycerides 08/02/2024 128     HDL, Direct 08/02/2024 68      LDL Calculated 08/02/2024 144 (H)     Non-HDL-Chol (CHOL-HDL) 08/02/2024 170        Suicide/Homicide Risk Assessment:    Risk of Harm to Self:  The following ratings are based on assessment at the time of the interview  Historical Risk Factors include: chronic psychiatric problems  Protective Factors: no current suicidal ideation, access to mental health treatment, compliant with medications, compliant with mental health treatment, having a desire to be alive, responsibilities and duties to others, strong relationships    Risk of Harm to Others:  The following ratings are based on assessment at the time of the interview  Historical Risk Factors include: none.  Protective Factors: no current homicidal ideation, access to mental health treatment, compliant with medications, compliant with mental health treatment, responsibilities and duties to others    The following interventions are recommended: continue medication management, contracts for safety at present - agrees to go to ED if feeling unsafe, contracts for safety at present - agrees to call Crisis Intervention Service if feeling unsafe      Lethality Statement:    Based on today's assessment and clinical criteria, Chad Cornejo contracts for safety and is not an imminent risk of harm to self or others. Outpatient level of care is deemed appropriate at this current time. Chad understands that if they can no longer contract for safety, they need to call the office or report to their nearest Emergency Room for immediate evaluation. They voiced understanding and agreement to call 911 or head to the nearest ED should they have any physical or mental decompensation whatsoever.       Assessment/Plan:     1.)  ADHD, unspecified  2.)  JACKELIN  3.)      After discussion of risks, benefits, potential side effects, alternatives, we will switch to Adderall XR 15 mg every morning due to patient reports that it was more efficacious.  He denies acute mental health complaints  or concerns at this time    Aware of 24 hour and weekend coverage for urgent situations accessed by calling [facility] main practice number    Medications Risks/Benefits      Risks, Benefits And Possible Side Effects Of Medications:    Risks, benefits, and possible side effects of medications explained to Chad and he verbalizes understanding and agreement for treatment.    Controlled Medication Discussion:     Chad has been filling controlled prescriptions on time as prescribed according to [place] Prescription Drug Monitoring Program    Psychotherapy Provided:     Individual psychotherapy provided: Crisis/safety plan discussed with Chad.     Treatment Plan:    Completed and signed during the session: Not applicable - Treatment Plan not due at this session      Visit Time    Visit Start Time: 11:00 AM  Visit Stop Time: 11:20 AM  Total Visit Duration:  20 minutes     The total visit duration detailed above includes: patient engagement, medication management, psychotherapy/counseling, discussion regarding treatment goals, documentation, review of past medical records, and coordination of care.      Note Share Disclaimer:     This note was not shared with the patient due to reasonable likelihood of causing patient harm      Margot Ace DO  Psychiatry  02/05/25

## 2025-02-14 ENCOUNTER — TELEMEDICINE (OUTPATIENT)
Dept: PSYCHIATRY | Facility: CLINIC | Age: 25
End: 2025-02-14
Payer: COMMERCIAL

## 2025-02-14 ENCOUNTER — TELEPHONE (OUTPATIENT)
Dept: PSYCHIATRY | Facility: CLINIC | Age: 25
End: 2025-02-14

## 2025-02-14 DIAGNOSIS — F90.1 ADHD, PREDOMINANTLY HYPERACTIVE TYPE: ICD-10-CM

## 2025-02-14 PROCEDURE — 99213 OFFICE O/P EST LOW 20 MIN: CPT | Performed by: STUDENT IN AN ORGANIZED HEALTH CARE EDUCATION/TRAINING PROGRAM

## 2025-02-14 NOTE — TELEPHONE ENCOUNTER
Patient contacted the office to schedule a follow up visit with provider. Patient is now scheduled for 3/7/25  at 11:30 am virtually.

## 2025-02-24 RX ORDER — DEXTROAMPHETAMINE SACCHARATE, AMPHETAMINE ASPARTATE, DEXTROAMPHETAMINE SULFATE AND AMPHETAMINE SULFATE 1.25; 1.25; 1.25; 1.25 MG/1; MG/1; MG/1; MG/1
5 TABLET ORAL
Qty: 30 TABLET | Refills: 0 | Status: SHIPPED | OUTPATIENT
Start: 2025-02-24 | End: 2025-03-26

## 2025-02-24 RX ORDER — DEXTROAMPHETAMINE SACCHARATE, AMPHETAMINE ASPARTATE MONOHYDRATE, DEXTROAMPHETAMINE SULFATE AND AMPHETAMINE SULFATE 3.75; 3.75; 3.75; 3.75 MG/1; MG/1; MG/1; MG/1
15 CAPSULE, EXTENDED RELEASE ORAL EVERY MORNING
Qty: 30 CAPSULE | Refills: 0 | Status: SHIPPED | OUTPATIENT
Start: 2025-02-24 | End: 2025-03-26

## 2025-02-24 NOTE — PSYCH
MEDICATION MANAGEMENT NOTE        [facility]      Name and Date of Birth:  Chad Cornejo 24 y.o. 2000 MRN: [id number]    Date of Visit: February 24, 2025    Reason for Visit: Follow-up visit for medication management       VIRTUAL CARE DOCUMENTATION:     1. This service was provided via Telemedicine using Other: Epic     2. Parties in the room with patient during teleconsult Patient only    3. Confidentiality My office door was closed     4. Participants No one else was in the room    5. Patient acknowledged consent and understanding of privacy and security of the  Telemedicine consult. I informed the patient that I have reviewed their record in Epic and presented the opportunity for them to ask any questions regarding the visit today.  The patient agreed to participate.    6. I have spent a total time of 25 minutes in caring for this patient on the day of the visit/encounter including Diagnostic results, Prognosis, Risks and benefits of tx options, Instructions for management, Patient and family education, Importance of tx compliance, Risk factor reductions, Impressions, Counseling / Coordination of care, Documenting in the medical record, Reviewing/placing orders in the medical record (including tests, medications, and/or procedures), Obtaining or reviewing history  , and Communicating with other healthcare professionals , not including the time spent for establishing the audio/video connection.       SUBJECTIVE:    Chad Cornejo is a 24 y.o. male with past psychiatric history significant for ADHD who was personally seen and evaluated today at the [facility] outpatient clinic for follow-up and medication management. Chad denies SI, HI, AVH, delusions, areli since our last.  After discussion of risks, benefits, potential side effects, alternatives, he states that Adderall XR 15 mg every morning worked better for him than these other  trials of various doses and we will continue on that dose however, he finds that it is wearing off towards the latter half of the day leaving his ADHD unmanaged towards that time.  We will initiate Adderall 5 mg after lunch time to cover this..  He denies acute mental complaints or concerns at this time      Current Rating Scores:     None completed today.    Review Of Systems:      Constitutional negative   ENT negative   Cardiovascular negative   Respiratory negative   Gastrointestinal negative   Genitourinary negative   Musculoskeletal negative   Integumentary negative   Neurological negative   Endocrine negative   Other Symptoms none, all other systems are negative       Past Psychiatric History: (unchanged information from previous note copied and italicized) - Information that is bolded has been updated.     See intake    Substance Abuse History: (unchanged information from previous note copied and italicized) - Information that is bolded has been updated.     See intake    Social History: (unchanged information from previous note copied and italicized) - Information that is bolded has been updated.     See intake    Traumatic History: (unchanged information from previous note copied and italicized) - Information that is bolded has been updated.     See intake      Past Medical History:    No past medical history on file.     Past Surgical History:   Procedure Laterality Date    EYE SURGERY      HERNIA REPAIR      [place] ESOPHAGOGASTRODUODENOSCOPY TRANSORAL DIAGNOSTIC N/A 4/24/2019    Procedure: ESOPHAGOGASTRODUODENOSCOPY (EGD);  Surgeon: Diaz Evans MD;  Location: [facility];  Service: Gastroenterology    WISDOM TOOTH EXTRACTION       Allergies   Allergen Reactions    Amoxicillin Hives    Cefzil [Cefprozil] Hives       Substance Abuse History:    Social History     Substance and Sexual Activity   Alcohol Use No     Social History     Substance and Sexual Activity   Drug Use No       Social History:    Social  History     Socioeconomic History    Marital status: Single     Spouse name: Not on file    Number of children: Not on file    Years of education: Not on file    Highest education level: Not on file   Occupational History    Not on file   Tobacco Use    Smoking status: Never    Smokeless tobacco: Never   Vaping Use    Vaping status: Never Used   Substance and Sexual Activity    Alcohol use: No    Drug use: No    Sexual activity: Yes     Partners: Female   Other Topics Concern    Not on file   Social History Narrative    Not on file     Social Drivers of Health     Financial Resource Strain: Low Risk  (5/16/2023)    [facility]    Overall Financial Resource Strain (CARDIA)     Difficulty of Paying Living Expenses: Not hard at all   Food Insecurity: No Food Insecurity (5/16/2023)    [facility]    Hunger Vital Sign     Worried About Running Out of Food in the Last Year: Never true     Ran Out of Food in the Last Year: Never true   Transportation Needs: No Transportation Needs (5/16/2023)    [facility]    PRAPARE - Transportation     Lack of Transportation (Medical): No     Lack of Transportation (Non-Medical): No   Physical Activity: Sufficiently Active (5/16/2023)    Received from [facility]    Exercise Vital Sign     Days of Exercise per Week: 7 days     Minutes of Exercise per Session: 120 min   Stress: No Stress Concern Present (5/16/2023)    [facility], [facility]    [place] [place] [employer] - Occupational Stress Questionnaire     Feeling of Stress : Not at all   Social Connections: Unknown (5/16/2023)    Received from [facility], [facility]    Social Connection and Isolation Panel [NHANES]     Frequency of  Communication with Friends and Family: More than three times a week     Frequency of Social Gatherings with Friends and Family: More than three times a week     Attends Church Services: Patient declined     Active Member of Clubs or Organizations: Patient declined     Attends Club or Organization Meetings: Patient declined     Marital Status: Never    Intimate Partner Violence: Not At Risk (5/16/2023)    [facility]    Humiliation, Afraid, Rape, and Kick questionnaire     Fear of Current or Ex-Partner: No     Emotionally Abused: No     Physically Abused: No     Sexually Abused: No   Housing Stability: Low Risk  (5/16/2023)    [facility]    Housing Stability Vital Sign     Unable to Pay for Housing in the Last Year: No     Number of Places Lived in the Last Year: 1     Unstable Housing in the Last Year: No       Family Psychiatric History:     Family History   Problem Relation Age of Onset    Anxiety disorder Mother     Cancer Mother     Hypertension Father     Depression Father     No Known Problems Sister     Cancer Brother     ADD / ADHD Brother     Cancer Maternal Grandmother     No Known Problems Maternal Grandfather     No Known Problems Paternal Grandmother     COPD Paternal Grandfather     Hypertension Paternal Grandfather     No Known Problems Maternal Aunt     No Known Problems Maternal Uncle     No Known Problems Paternal Aunt     No Known Problems Paternal Uncle     Arthritis Family     Cancer Family     Osteoporosis Family     Anesthesia problems Neg Hx     Clotting disorder Neg Hx     Collagen disease Neg Hx     Diabetes Neg Hx     Dislocations Neg Hx     Learning disabilities Neg Hx     Neurological problems Neg Hx     Rheumatologic disease Neg Hx     Scoliosis Neg Hx     Vascular Disease Neg Hx        History Review: The following portions of the patient's history were  reviewed and updated as appropriate: allergies, current medications, past family history, past medical history, past social history, past surgical history, and problem list.         OBJECTIVE:     Vital signs in last 24 hours:    There were no vitals filed for this visit.    Mental Status Evaluation:    Appearance age appropriate, casually dressed   Behavior cooperative, mildly anxious   Speech normal rate, normal volume, normal pitch   Mood normal   Affect constricted   Thought Processes organized, goal directed   Associations intact associations   Thought Content no overt delusions   Perceptual Disturbances: no auditory hallucinations, no visual hallucinations   Abnormal Thoughts  Risk Potential Suicidal ideation - None at present  Homicidal ideation - None at present  Potential for aggression - Not at present   Orientation oriented to person, place, time/date, and situation   Memory recent and remote memory grossly intact   Consciousness alert and awake   Attention Span Concentration Span attention span and concentration appear shorter than expected for age   Intellect appears to be of average intelligence   Insight intact   Judgement intact   Muscle Strength and  Gait unable to assess today due to virtual visit   Motor activity unable to assess today due to virtual visit   Language no difficulty naming common objects, no difficulty repeating a phrase   Fund of Knowledge adequate knowledge of current events  adequate fund of knowledge regarding past history  adequate fund of knowledge regarding vocabulary    Pain none   Pain Scale Did not ask patient to formally rate       Laboratory Results: I have personally reviewed all pertinent laboratory/tests results    Recent Labs (last 2 months):   No visits with results within 2 Month(s) from this visit.   Latest known visit with results is:   Appointment on 08/02/2024   Component Date Value    Hemoglobin A1C 08/02/2024 5.4     EAG 08/02/2024 108     Cholesterol  08/02/2024 238 (H)     Triglycerides 08/02/2024 128     HDL, Direct 08/02/2024 68     LDL Calculated 08/02/2024 144 (H)     Non-HDL-Chol (CHOL-HDL) 08/02/2024 170        Suicide/Homicide Risk Assessment:    Risk of Harm to Self:  The following ratings are based on assessment at the time of the interview  Historical Risk Factors include: chronic psychiatric problems  Protective Factors: no current suicidal ideation, access to mental health treatment, compliant with medications, compliant with mental health treatment, having a desire to be alive, responsibilities and duties to others, strong relationships    Risk of Harm to Others:  The following ratings are based on assessment at the time of the interview  Historical Risk Factors include: none.  Protective Factors: no current homicidal ideation, access to mental health treatment, compliant with medications, compliant with mental health treatment, responsibilities and duties to others    The following interventions are recommended: continue medication management, contracts for safety at present - agrees to go to ED if feeling unsafe, contracts for safety at present - agrees to call Crisis Intervention Service if feeling unsafe      Lethality Statement:    Based on today's assessment and clinical criteria, Chad Cornejo contracts for safety and is not an imminent risk of harm to self or others. Outpatient level of care is deemed appropriate at this current time. Chad understands that if they can no longer contract for safety, they need to call the office or report to their nearest Emergency Room for immediate evaluation. They voiced understanding and agreement to call 911 or head to the nearest ED should they have any physical or mental decompensation whatsoever.       Assessment/Plan:     1.)  ADHD, unspecified  2.)  JACKELIN  3.)      After discussion of risks, benefits, potential side effects, alternatives, we will continue Adderall XR 15 mg every morning and initiate  5 mg Adderall immediate release in the afternoon.  He denies acute mental complaints concerns at this time    Aware of 24 hour and weekend coverage for urgent situations accessed by calling [facility] main practice number    Medications Risks/Benefits      Risks, Benefits And Possible Side Effects Of Medications:    Risks, benefits, and possible side effects of medications explained to Chad and he verbalizes understanding and agreement for treatment.    Controlled Medication Discussion:     Chad has been filling controlled prescriptions on time as prescribed according to [place] Prescription Drug Monitoring Program    Psychotherapy Provided:     Individual psychotherapy provided: Crisis/safety plan discussed with Chad.     Treatment Plan:    Completed and signed during the session: Not applicable - Treatment Plan not due at this session      Visit Time    Visit Start Time: 11:30 AM  Visit Stop Time: 11:55 AM  Total Visit Duration:  25 minutes     The total visit duration detailed above includes: patient engagement, medication management, psychotherapy/counseling, discussion regarding treatment goals, documentation, review of past medical records, and coordination of care.      Note Share Disclaimer:     This note was not shared with the patient due to reasonable likelihood of causing patient harm      Margot Ace DO  Psychiatry  02/24/25

## 2025-03-17 ENCOUNTER — TELEPHONE (OUTPATIENT)
Dept: PSYCHIATRY | Facility: CLINIC | Age: 25
End: 2025-03-17

## 2025-03-17 ENCOUNTER — TELEMEDICINE (OUTPATIENT)
Dept: PSYCHIATRY | Facility: CLINIC | Age: 25
End: 2025-03-17
Payer: COMMERCIAL

## 2025-03-17 ENCOUNTER — TELEPHONE (OUTPATIENT)
Age: 25
End: 2025-03-17

## 2025-03-17 DIAGNOSIS — F90.1 ADHD, PREDOMINANTLY HYPERACTIVE TYPE: ICD-10-CM

## 2025-03-17 DIAGNOSIS — F41.1 GENERALIZED ANXIETY DISORDER: Primary | ICD-10-CM

## 2025-03-17 PROCEDURE — 99214 OFFICE O/P EST MOD 30 MIN: CPT | Performed by: STUDENT IN AN ORGANIZED HEALTH CARE EDUCATION/TRAINING PROGRAM

## 2025-03-17 PROCEDURE — 90833 PSYTX W PT W E/M 30 MIN: CPT | Performed by: STUDENT IN AN ORGANIZED HEALTH CARE EDUCATION/TRAINING PROGRAM

## 2025-03-17 NOTE — TELEPHONE ENCOUNTER
Patient called in to schedule a f/u MM appt.     Patient is now scheduled for 3/17 @10am virtually.

## 2025-03-25 ENCOUNTER — TELEPHONE (OUTPATIENT)
Dept: PSYCHIATRY | Facility: CLINIC | Age: 25
End: 2025-03-25

## 2025-03-25 NOTE — TELEPHONE ENCOUNTER
Called pt and left VM regarding scheduling 1mo f/u appt with Dr. Ace.    Gave [facility] phone number to call back and schedule with provider.

## 2025-03-27 RX ORDER — DEXTROAMPHETAMINE SACCHARATE, AMPHETAMINE ASPARTATE, DEXTROAMPHETAMINE SULFATE AND AMPHETAMINE SULFATE 1.25; 1.25; 1.25; 1.25 MG/1; MG/1; MG/1; MG/1
5 TABLET ORAL
Qty: 30 TABLET | Refills: 0 | Status: SHIPPED | OUTPATIENT
Start: 2025-03-27 | End: 2025-04-26

## 2025-03-27 RX ORDER — DEXTROAMPHETAMINE SACCHARATE, AMPHETAMINE ASPARTATE MONOHYDRATE, DEXTROAMPHETAMINE SULFATE AND AMPHETAMINE SULFATE 5; 5; 5; 5 MG/1; MG/1; MG/1; MG/1
20 CAPSULE, EXTENDED RELEASE ORAL EVERY MORNING
Qty: 30 CAPSULE | Refills: 0 | Status: SHIPPED | OUTPATIENT
Start: 2025-03-27

## 2025-03-28 NOTE — PSYCH
MEDICATION MANAGEMENT NOTE        [facility]      Name and Date of Birth:  Chad Cornejo 24 y.o. 2000 MRN: [id number]    Date of Visit: March 28, 2025    Reason for Visit: Follow-up visit for medication management     Administrative Statements   Encounter provider Margot Ace DO    The Patient is located at Home and in the following state in which I hold an active license [place].    The patient was identified by name and date of birth. Chad Cornejo was informed that this is a telemedicine visit and that the visit is being conducted through the Epic Embedded platform. He agrees to proceed..  My office door was closed. No one else was in the room.  He acknowledged consent and understanding of privacy and security of the video platform. The patient has agreed to participate and understands they can discontinue the visit at any time.    I have spent a total time of 28 minutes in caring for this patient on the day of the visit/encounter including Diagnostic results, Prognosis, Risks and benefits of tx options, Instructions for management, Patient and family education, Importance of tx compliance, Risk factor reductions, Impressions, Counseling / Coordination of care, Documenting in the medical record, Reviewing/placing orders in the medical record (including tests, medications, and/or procedures), and Obtaining or reviewing history  , not including the time spent for establishing the audio/video connection.     SUBJECTIVE:    Chad Cornejo is a 24 y.o. male with past psychiatric history significant for ADHD who was personally seen and evaluated today at the [facility] outpatient clinic for follow-up and medication management. Chad denies SI, HI, AVH, delusions, areli since our last.  After discussion of risks, benefits, potential side effects, alternatives,we will increase Adderall to 20 mg extended release every morning to better control  his ADHD.  He denies acute mental complaints or concerns at this time      Current Rating Scores:     None completed today.    Review Of Systems:      Constitutional negative   ENT negative   Cardiovascular negative   Respiratory negative   Gastrointestinal negative   Genitourinary negative   Musculoskeletal negative   Integumentary negative   Neurological negative   Endocrine negative   Other Symptoms none, all other systems are negative       Past Psychiatric History: (unchanged information from previous note copied and italicized) - Information that is bolded has been updated.     See intake    Substance Abuse History: (unchanged information from previous note copied and italicized) - Information that is bolded has been updated.     See intake    Social History: (unchanged information from previous note copied and italicized) - Information that is bolded has been updated.     See intake    Traumatic History: (unchanged information from previous note copied and italicized) - Information that is bolded has been updated.     See intake      Past Medical History:    No past medical history on file.     Past Surgical History:   Procedure Laterality Date    EYE SURGERY      HERNIA REPAIR      IL ESOPHAGOGASTRODUODENOSCOPY TRANSORAL DIAGNOSTIC N/A 4/24/2019    Procedure: ESOPHAGOGASTRODUODENOSCOPY (EGD);  Surgeon: Diaz Evans MD;  Location: [facility];  Service: Gastroenterology    WISDOM TOOTH EXTRACTION       Allergies   Allergen Reactions    Amoxicillin Hives    Cefzil [Cefprozil] Hives       Substance Abuse History:    Social History     Substance and Sexual Activity   Alcohol Use No     Social History     Substance and Sexual Activity   Drug Use No       Social History:    Social History     Socioeconomic History    Marital status: Single     Spouse name: Not on file    Number of children: Not on file    Years of education: Not on file    Highest education level: Not on file   Occupational History    Not on  file   Tobacco Use    Smoking status: Never    Smokeless tobacco: Never   Vaping Use    Vaping status: Never Used   Substance and Sexual Activity    Alcohol use: No    Drug use: No    Sexual activity: Yes     Partners: Female   Other Topics Concern    Not on file   Social History Narrative    Not on file     Social Drivers of Health     Financial Resource Strain: Low Risk  (5/16/2023)    [facility]    Overall Financial Resource Strain (CARDIA)     Difficulty of Paying Living Expenses: Not hard at all   Food Insecurity: No Food Insecurity (5/16/2023)    [facility] from [facility], [facility]    Hunger Vital Sign     Worried About Running Out of Food in the Last Year: Never true     Ran Out of Food in the Last Year: Never true   Transportation Needs: No Transportation Needs (5/16/2023)    [facility] from [facility], [facility]    PRAPARE - Transportation     Lack of Transportation (Medical): No     Lack of Transportation (Non-Medical): No   Physical Activity: Sufficiently Active (5/16/2023)    Received from [facility]    Exercise Vital Sign     Days of Exercise per Week: 7 days     Minutes of Exercise per Session: 120 min   Stress: No Stress Concern Present (5/16/2023)    Received from [facility], [facility]    [employer] [place] [employer] - Occupational Stress Questionnaire     Feeling of Stress : Not at all   Social Connections: Unknown (5/16/2023)    Received from [facility], [facility]    Social Connection and Isolation Panel [NHANES]     Frequency of Communication with Friends and Family: More than three times a week     Frequency of Social Gatherings with Friends and Family: More than three times a week     Attends Taoist Services: Patient declined     Active Member of Clubs or  Organizations: Patient declined     Attends Club or Organization Meetings: Patient declined     Marital Status: Never    Intimate Partner Violence: Not At Risk (5/16/2023)    [facility]    Humiliation, Afraid, Rape, and Kick questionnaire     Fear of Current or Ex-Partner: No     Emotionally Abused: No     Physically Abused: No     Sexually Abused: No   Housing Stability: Low Risk  (5/16/2023)    [facility] from [facility]    Housing Stability Vital Sign     Unable to Pay for Housing in the Last Year: No     Number of Places Lived in the Last Year: 1     Unstable Housing in the Last Year: No       Family Psychiatric History:     Family History   Problem Relation Age of Onset    Anxiety disorder Mother     Cancer Mother     Hypertension Father     Depression Father     No Known Problems Sister     Cancer Brother     ADD / ADHD Brother     Cancer Maternal Grandmother     No Known Problems Maternal Grandfather     No Known Problems Paternal Grandmother     COPD Paternal Grandfather     Hypertension Paternal Grandfather     No Known Problems Maternal Aunt     No Known Problems Maternal Uncle     No Known Problems Paternal Aunt     No Known Problems Paternal Uncle     Arthritis Family     Cancer Family     Osteoporosis Family     Anesthesia problems Neg Hx     Clotting disorder Neg Hx     Collagen disease Neg Hx     Diabetes Neg Hx     Dislocations Neg Hx     Learning disabilities Neg Hx     Neurological problems Neg Hx     Rheumatologic disease Neg Hx     Scoliosis Neg Hx     Vascular Disease Neg Hx        History Review: The following portions of the patient's history were reviewed and updated as appropriate: allergies, current medications, past family history, past medical history, past social history, past surgical history, and problem list.         OBJECTIVE:     Vital signs in last 24 hours:    There  were no vitals filed for this visit.    Mental Status Evaluation:    Appearance age appropriate, casually dressed   Behavior cooperative, mildly anxious   Speech normal rate, normal volume, normal pitch   Mood normal   Affect constricted   Thought Processes organized, goal directed   Associations intact associations   Thought Content no overt delusions   Perceptual Disturbances: no auditory hallucinations, no visual hallucinations   Abnormal Thoughts  Risk Potential Suicidal ideation - None at present  Homicidal ideation - None at present  Potential for aggression - Not at present   Orientation oriented to person, place, time/date, and situation   Memory recent and remote memory grossly intact   Consciousness alert and awake   Attention Span Concentration Span attention span and concentration appear shorter than expected for age   Intellect appears to be of average intelligence   Insight intact   Judgement intact   Muscle Strength and  Gait unable to assess today due to virtual visit   Motor activity unable to assess today due to virtual visit   Language no difficulty naming common objects, no difficulty repeating a phrase   Fund of Knowledge adequate knowledge of current events  adequate fund of knowledge regarding past history  adequate fund of knowledge regarding vocabulary    Pain none   Pain Scale Did not ask patient to formally rate       Laboratory Results: I have personally reviewed all pertinent laboratory/tests results    Recent Labs (last 2 months):   No visits with results within 2 Month(s) from this visit.   Latest known visit with results is:   Appointment on 08/02/2024   Component Date Value    Hemoglobin A1C 08/02/2024 5.4     EAG 08/02/2024 108     Cholesterol 08/02/2024 238 (H)     Triglycerides 08/02/2024 128     HDL, Direct 08/02/2024 68     LDL Calculated 08/02/2024 144 (H)     Non-HDL-Chol (CHOL-HDL) 08/02/2024 170        Suicide/Homicide Risk Assessment:    Risk of Harm to Self:  The following  ratings are based on assessment at the time of the interview  Historical Risk Factors include: chronic psychiatric problems  Protective Factors: no current suicidal ideation, access to mental health treatment, compliant with medications, compliant with mental health treatment, having a desire to be alive, responsibilities and duties to others, strong relationships    Risk of Harm to Others:  The following ratings are based on assessment at the time of the interview  Historical Risk Factors include: none.  Protective Factors: no current homicidal ideation, access to mental health treatment, compliant with medications, compliant with mental health treatment, responsibilities and duties to others    The following interventions are recommended: continue medication management, contracts for safety at present - agrees to go to ED if feeling unsafe, contracts for safety at present - agrees to call Crisis Intervention Service if feeling unsafe      Lethality Statement:    Based on today's assessment and clinical criteria, Chad Cornejo contracts for safety and is not an imminent risk of harm to self or others. Outpatient level of care is deemed appropriate at this current time. Chad understands that if they can no longer contract for safety, they need to call the office or report to their nearest Emergency Room for immediate evaluation. They voiced understanding and agreement to call 911 or head to the nearest ED should they have any physical or mental decompensation whatsoever.       Assessment/Plan:     1.)  ADHD, unspecified  2.)  JACKELIN  3.)      After discussion of risks, benefits, potential side effects, alternatives, we will increase Adderall to XR 20 mg every morning, continue 5 mg immediate release in the afternoon.  Patient denies acute mental complaints or concerns at this time.        Aware of 24 hour and weekend coverage for urgent situations accessed by calling [facility] main practice  number    Medications Risks/Benefits      Risks, Benefits And Possible Side Effects Of Medications:    Risks, benefits, and possible side effects of medications explained to Chad and he verbalizes understanding and agreement for treatment.    Controlled Medication Discussion:     Chad has been filling controlled prescriptions on time as prescribed according to [place] Prescription Drug Monitoring Program    Psychotherapy Provided:     Individual psychotherapy provided: Crisis/safety plan discussed with Chad. Provided at least 16 minutes of distinct psychotherapy using a combination of supportive, interpersonal, motivational, and problem solving approaches to address psychological distress and enhance coping strategies.     Treatment Plan:    Completed and signed during the session: Not applicable - Treatment Plan not due at this session      Visit Time    Visit Start Time: 10:00 AM  Visit Stop Time: 10:28 AM  Total Visit Duration:  28 minutes     The total visit duration detailed above includes: patient engagement, medication management, psychotherapy/counseling, discussion regarding treatment goals, documentation, review of past medical records, and coordination of care.      Note Share Disclaimer:     This note was not shared with the patient due to reasonable likelihood of causing patient harm      Margot Ace DO  Psychiatry  03/28/25

## 2025-04-18 ENCOUNTER — TELEPHONE (OUTPATIENT)
Dept: PSYCHIATRY | Facility: CLINIC | Age: 25
End: 2025-04-18

## 2025-04-18 ENCOUNTER — TELEMEDICINE (OUTPATIENT)
Dept: PSYCHIATRY | Facility: CLINIC | Age: 25
End: 2025-04-18
Payer: COMMERCIAL

## 2025-04-18 DIAGNOSIS — F90.1 ADHD, PREDOMINANTLY HYPERACTIVE TYPE: ICD-10-CM

## 2025-04-18 PROCEDURE — 90833 PSYTX W PT W E/M 30 MIN: CPT | Performed by: STUDENT IN AN ORGANIZED HEALTH CARE EDUCATION/TRAINING PROGRAM

## 2025-04-18 PROCEDURE — 99213 OFFICE O/P EST LOW 20 MIN: CPT | Performed by: STUDENT IN AN ORGANIZED HEALTH CARE EDUCATION/TRAINING PROGRAM

## 2025-04-18 NOTE — TELEPHONE ENCOUNTER
Called pt and left VM regarding scheduling 1mo f/u with Dr. Ace.    Gave [facility] phone number to call back and schedule with provider.

## 2025-04-23 RX ORDER — DEXTROAMPHETAMINE SACCHARATE, AMPHETAMINE ASPARTATE MONOHYDRATE, DEXTROAMPHETAMINE SULFATE AND AMPHETAMINE SULFATE 5; 5; 5; 5 MG/1; MG/1; MG/1; MG/1
20 CAPSULE, EXTENDED RELEASE ORAL EVERY MORNING
Qty: 30 CAPSULE | Refills: 0 | Status: SHIPPED | OUTPATIENT
Start: 2025-04-23

## 2025-04-23 RX ORDER — DEXTROAMPHETAMINE SACCHARATE, AMPHETAMINE ASPARTATE, DEXTROAMPHETAMINE SULFATE AND AMPHETAMINE SULFATE 1.25; 1.25; 1.25; 1.25 MG/1; MG/1; MG/1; MG/1
5 TABLET ORAL
Qty: 30 TABLET | Refills: 0 | Status: SHIPPED | OUTPATIENT
Start: 2025-04-23 | End: 2025-05-23

## 2025-04-24 NOTE — PSYCH
MEDICATION MANAGEMENT NOTE        [facility]      Name and Date of Birth:  Chad Cornejo 25 y.o. 2000 MRN: [id number]    Date of Visit: April 23, 2025    Reason for Visit: Follow-up visit for medication management     Administrative Statements   Encounter provider Margot Ace DO    The Patient is located at Home and in the following state in which I hold an active license [place].    The patient was identified by name and date of birth. Chad Cornejo was informed that this is a telemedicine visit and that the visit is being conducted through the Epic Embedded platform. He agrees to proceed..  My office door was closed. No one else was in the room.  He acknowledged consent and understanding of privacy and security of the video platform. The patient has agreed to participate and understands they can discontinue the visit at any time.    I have spent a total time of 28 minutes in caring for this patient on the day of the visit/encounter including Diagnostic results, Prognosis, Risks and benefits of tx options, Instructions for management, Patient and family education, Importance of tx compliance, Risk factor reductions, Impressions, Counseling / Coordination of care, Documenting in the medical record, Reviewing/placing orders in the medical record (including tests, medications, and/or procedures), and Obtaining or reviewing history  , not including the time spent for establishing the audio/video connection.   SUBJECTIVE:    Chad Cornejo is a 25 y.o. male with past psychiatric history significant for ADHD who was personally seen and evaluated today at the [facility] outpatient clinic for follow-up and medication management. Chad denies SI, HI, AVH, delusions, areli since our last.  After discussion of risks, benefits, potential side effects, alternatives, we will continue on current regimen as is without any changes due to its  effectiveness.  Patient denies acute mental complaints or concerns at this time    Current Rating Scores:     None completed today.    Review Of Systems:      Constitutional negative   ENT negative   Cardiovascular negative   Respiratory negative   Gastrointestinal negative   Genitourinary negative   Musculoskeletal negative   Integumentary negative   Neurological negative   Endocrine negative   Other Symptoms none, all other systems are negative       Past Psychiatric History: (unchanged information from previous note copied and italicized) - Information that is bolded has been updated.     See intake    Substance Abuse History: (unchanged information from previous note copied and italicized) - Information that is bolded has been updated.     See intake    Social History: (unchanged information from previous note copied and italicized) - Information that is bolded has been updated.     See intake    Traumatic History: (unchanged information from previous note copied and italicized) - Information that is bolded has been updated.     See intake      Past Medical History:    No past medical history on file.     Past Surgical History:   Procedure Laterality Date    EYE SURGERY      HERNIA REPAIR      [place] ESOPHAGOGASTRODUODENOSCOPY TRANSORAL DIAGNOSTIC N/A 4/24/2019    Procedure: ESOPHAGOGASTRODUODENOSCOPY (EGD);  Surgeon: Diaz Evans MD;  Location: [facility];  Service: Gastroenterology    WISDOM TOOTH EXTRACTION       Allergies   Allergen Reactions    Amoxicillin Hives    Cefzil [Cefprozil] Hives       Substance Abuse History:    Social History     Substance and Sexual Activity   Alcohol Use No     Social History     Substance and Sexual Activity   Drug Use No       Social History:    Social History     Socioeconomic History    Marital status: Single     Spouse name: Not on file    Number of children: Not on file    Years of education: Not on file    Highest education level: Not on file   Occupational History     Not on file   Tobacco Use    Smoking status: Never    Smokeless tobacco: Never   Vaping Use    Vaping status: Never Used   Substance and Sexual Activity    Alcohol use: No    Drug use: No    Sexual activity: Yes     Partners: Female   Other Topics Concern    Not on file   Social History Narrative    Not on file     Social Drivers of Health     Financial Resource Strain: Low Risk  (5/16/2023)    [facility]    Overall Financial Resource Strain (CARDIA)     Difficulty of Paying Living Expenses: Not hard at all   Food Insecurity: No Food Insecurity (5/16/2023)    [facility] from [facility], [facility]    Hunger Vital Sign     Worried About Running Out of Food in the Last Year: Never true     Ran Out of Food in the Last Year: Never true   Transportation Needs: No Transportation Needs (5/16/2023)    Received from [facility], [facility]    PRAPARE - Transportation     Lack of Transportation (Medical): No     Lack of Transportation (Non-Medical): No   Physical Activity: Sufficiently Active (5/16/2023)    Received from [facility]    Exercise Vital Sign     Days of Exercise per Week: 7 days     Minutes of Exercise per Session: 120 min   Stress: No Stress Concern Present (5/16/2023)    [facility] from [facility], [facility]    [place] [employer] - Occupational Stress Questionnaire     Feeling of Stress : Not at all   Social Connections: Unknown (5/16/2023)    Received from [facility], [facility]    Social Connection and Isolation Panel [NHANES]     Frequency of Communication with Friends and Family: More than three times a week     Frequency of Social Gatherings with Friends and Family: More than three times a week     Attends [employer] Services: Patient declined     Active Member of Clubs  or Organizations: Patient declined     Attends Club or Organization Meetings: Patient declined     Marital Status: Never    Intimate Partner Violence: Not At Risk (5/16/2023)    [facility]    Humiliation, Afraid, Rape, and Kick questionnaire     Fear of Current or Ex-Partner: No     Emotionally Abused: No     Physically Abused: No     Sexually Abused: No   Housing Stability: Low Risk  (5/16/2023)    [facility] from [facility], [facility]    Housing Stability Vital Sign     Unable to Pay for Housing in the Last Year: No     Number of Places Lived in the Last Year: 1     Unstable Housing in the Last Year: No       Family Psychiatric History:     Family History   Problem Relation Age of Onset    Anxiety disorder Mother     Cancer Mother     Hypertension Father     Depression Father     No Known Problems Sister     Cancer Brother     ADD / ADHD Brother     Cancer Maternal Grandmother     No Known Problems Maternal Grandfather     No Known Problems Paternal Grandmother     COPD Paternal Grandfather     Hypertension Paternal Grandfather     No Known Problems Maternal Aunt     No Known Problems Maternal Uncle     No Known Problems Paternal Aunt     No Known Problems Paternal Uncle     Arthritis Family     Cancer Family     Osteoporosis Family     Anesthesia problems Neg Hx     Clotting disorder Neg Hx     Collagen disease Neg Hx     Diabetes Neg Hx     Dislocations Neg Hx     Learning disabilities Neg Hx     Neurological problems Neg Hx     Rheumatologic disease Neg Hx     Scoliosis Neg Hx     Vascular Disease Neg Hx        History Review: The following portions of the patient's history were reviewed and updated as appropriate: allergies, current medications, past family history, past medical history, past social history, past surgical history, and problem list.         OBJECTIVE:     Vital signs in last 24  hours:    There were no vitals filed for this visit.    Mental Status Evaluation:    Appearance age appropriate, casually dressed   Behavior cooperative, mildly anxious   Speech normal rate, normal volume, normal pitch   Mood normal   Affect constricted   Thought Processes organized, goal directed   Associations intact associations   Thought Content no overt delusions   Perceptual Disturbances: no auditory hallucinations, no visual hallucinations   Abnormal Thoughts  Risk Potential Suicidal ideation - None at present  Homicidal ideation - None at present  Potential for aggression - Not at present   Orientation oriented to person, place, time/date, and situation   Memory recent and remote memory grossly intact   Consciousness alert and awake   Attention Span Concentration Span attention span and concentration appear shorter than expected for age   Intellect appears to be of average intelligence   Insight intact   Judgement intact   Muscle Strength and  Gait unable to assess today due to virtual visit   Motor activity unable to assess today due to virtual visit   Language no difficulty naming common objects, no difficulty repeating a phrase   Fund of Knowledge adequate knowledge of current events  adequate fund of knowledge regarding past history  adequate fund of knowledge regarding vocabulary    Pain none   Pain Scale Did not ask patient to formally rate       Laboratory Results: I have personally reviewed all pertinent laboratory/tests results    Recent Labs (last 2 months):   No visits with results within 2 Month(s) from this visit.   Latest known visit with results is:   Appointment on 08/02/2024   Component Date Value    Hemoglobin A1C 08/02/2024 5.4     EAG 08/02/2024 108     Cholesterol 08/02/2024 238 (H)     Triglycerides 08/02/2024 128     HDL, Direct 08/02/2024 68     LDL Calculated 08/02/2024 144 (H)     Non-HDL-Chol (CHOL-HDL) 08/02/2024 170        Suicide/Homicide Risk Assessment:    Risk of Harm to  Self:  The following ratings are based on assessment at the time of the interview  Historical Risk Factors include: chronic psychiatric problems  Protective Factors: no current suicidal ideation, access to mental health treatment, compliant with medications, compliant with mental health treatment, having a desire to be alive, responsibilities and duties to others, strong relationships    Risk of Harm to Others:  The following ratings are based on assessment at the time of the interview  Historical Risk Factors include: none.  Protective Factors: no current homicidal ideation, access to mental health treatment, compliant with medications, compliant with mental health treatment, responsibilities and duties to others    The following interventions are recommended: continue medication management, contracts for safety at present - agrees to go to ED if feeling unsafe, contracts for safety at present - agrees to call Crisis Intervention Service if feeling unsafe      Lethality Statement:    Based on today's assessment and clinical criteria, Chad Cornejo contracts for safety and is not an imminent risk of harm to self or others. Outpatient level of care is deemed appropriate at this current time. Chad understands that if they can no longer contract for safety, they need to call the office or report to their nearest Emergency Room for immediate evaluation. They voiced understanding and agreement to call 911 or head to the nearest ED should they have any physical or mental decompensation whatsoever.       Assessment/Plan:     1.)  ADHD, unspecified  2.)  JACKELIN  3.)      After discussion of risks, benefits, potential side effects, alternatives, we will continue Adderall XR 20 mg every morning, 5 mg q. afternoon.  Patient denies acute mental complaints or concerns at this time        Aware of 24 hour and weekend coverage for urgent situations accessed by calling [facility] main practice  number    Medications Risks/Benefits      Risks, Benefits And Possible Side Effects Of Medications:    Risks, benefits, and possible side effects of medications explained to Chad and he verbalizes understanding and agreement for treatment.    Controlled Medication Discussion:     Chad has been filling controlled prescriptions on time as prescribed according to [place] Prescription Drug Monitoring Program    Psychotherapy Provided:     Individual psychotherapy provided: Crisis/safety plan discussed with Chad. Provided at least 16 minutes of distinct psychotherapy using a combination of supportive, interpersonal, motivational, and problem solving approaches to address psychological distress and enhance coping strategies.     Treatment Plan:    Completed and signed during the session: Not applicable - Treatment Plan not due at this session      Visit Time    Visit Start Time: 11:00 AM  Visit Stop Time: 11:28 AM  Total Visit Duration:  28 minutes     The total visit duration detailed above includes: patient engagement, medication management, psychotherapy/counseling, discussion regarding treatment goals, documentation, review of past medical records, and coordination of care.      Note Share Disclaimer:     This note was not shared with the patient due to reasonable likelihood of causing patient harm      Margot Ace DO  Psychiatry  04/23/25

## 2025-04-25 ENCOUNTER — TELEPHONE (OUTPATIENT)
Dept: PSYCHIATRY | Facility: CLINIC | Age: 25
End: 2025-04-25

## 2025-04-25 NOTE — TELEPHONE ENCOUNTER
Called pt and left VM regarding scheduling 2mo f/u appt with Dr. Ace.    Gave [facility] phone number to call and schedule with provider.

## 2025-05-06 ENCOUNTER — TELEPHONE (OUTPATIENT)
Age: 25
End: 2025-05-06

## 2025-05-06 NOTE — TELEPHONE ENCOUNTER
Patient contacted the office to schedule a follow up visit with provider Dr Margot Ace. Patient is now scheduled for 6/20/25 at 11:30 am virtually.

## 2025-05-16 ENCOUNTER — OFFICE VISIT (OUTPATIENT)
Dept: FAMILY MEDICINE CLINIC | Facility: CLINIC | Age: 25
End: 2025-05-16
Payer: COMMERCIAL

## 2025-05-16 VITALS
TEMPERATURE: 97.4 F | BODY MASS INDEX: 33 KG/M2 | SYSTOLIC BLOOD PRESSURE: 128 MMHG | OXYGEN SATURATION: 98 % | DIASTOLIC BLOOD PRESSURE: 82 MMHG | HEART RATE: 97 BPM | RESPIRATION RATE: 16 BRPM | WEIGHT: 249 LBS | HEIGHT: 73 IN

## 2025-05-16 DIAGNOSIS — Z00.00 ANNUAL PHYSICAL EXAM: Primary | ICD-10-CM

## 2025-05-16 DIAGNOSIS — Z13.220 SCREENING FOR LIPID DISORDERS: ICD-10-CM

## 2025-05-16 DIAGNOSIS — E66.9 OBESITY (BMI 30-39.9): ICD-10-CM

## 2025-05-16 DIAGNOSIS — F90.9 ATTENTION DEFICIT HYPERACTIVITY DISORDER (ADHD), UNSPECIFIED ADHD TYPE: ICD-10-CM

## 2025-05-16 DIAGNOSIS — Z13.1 SCREENING FOR DIABETES MELLITUS (DM): ICD-10-CM

## 2025-05-16 DIAGNOSIS — F41.9 ANXIETY: ICD-10-CM

## 2025-05-16 PROCEDURE — 99213 OFFICE O/P EST LOW 20 MIN: CPT

## 2025-05-16 PROCEDURE — 99395 PREV VISIT EST AGE 18-39: CPT

## 2025-05-16 NOTE — ASSESSMENT & PLAN NOTE
Stable on Adderall XR 20 mg in am and Adderall 5 mg after lunch, pt is following with [facility]'s psychiatry.   Orders:  •  CBC and differential  •  Comprehensive metabolic panel

## 2025-05-16 NOTE — PROGRESS NOTES
Adult Annual Physical  Name: Chad Cornejo      : 2000      MRN: [id number]  Encounter Provider: AYAN Holguin  Encounter Date: 2025   Encounter department: [facility]    :  Assessment & Plan  Annual physical exam  CPE done, routine labs ordered. Continue healthy eating habits and regular exercise.       Attention deficit hyperactivity disorder (ADHD), unspecified ADHD type  Stable on Adderall XR 20 mg in am and Adderall 5 mg after lunch, pt is following with [facility] psychiatry.   Orders:  •  CBC and differential  •  Comprehensive metabolic panel    Screening for lipid disorders    Orders:  •  Lipid panel    Screening for diabetes mellitus (DM)    Orders:  •  Hemoglobin A1C    Obesity (BMI 30-39.9)  Continue healthy eating habits and regular exercise         Anxiety  Stable on Adderall therapy as prescribed by psychiatry.           Preventive Screenings:  - Diabetes Screening: orders placed and screening up-to-date  - Hepatitis C screening: patient declines   - HIV screening: patient declines   - Colon cancer screening: screening not indicated   - Lung cancer screening: screening not indicated   - Prostate cancer screening: screening not indicated     Counseling/Anticipatory Guidance:    - Diet: discussed recommendations for a healthy/well-balanced diet.   - Exercise: the importance of regular exercise/physical activity was discussed. Recommend exercise 3-5 times per week for at least 30 minutes.       Depression Screening and Follow-up Plan: Patient was screened for depression during today's encounter. They screened negative with a PHQ-2 score of 0.          History of Present Illness     Adult Annual Physical:  Patient presents for annual physical.     Diet and Physical Activity:  - Diet/Nutrition: no special diet and limited junk food.  - Exercise: moderate cardiovascular exercise and 5-7 times a week on average.    Depression Screening:  - PHQ-2 Score:  0    General Health:  - Sleep: sleeps well and 4-6 hours of sleep on average.  - Hearing: normal hearing bilateral ears.  - Vision: wears glasses and most recent eye exam > 1 year ago.  - Dental: no dental visits for > 1 year and brushes teeth twice daily.     Health:  - History of STDs: no.   - Urinary symptoms: none.     Review of Systems   Constitutional:  Negative for activity change, appetite change, chills, diaphoresis, fatigue, fever and unexpected weight change.   HENT:  Negative for congestion, dental problem, drooling, ear discharge, ear pain, facial swelling, hearing loss, mouth sores, nosebleeds, postnasal drip, rhinorrhea, sinus pressure, sinus pain, sneezing, sore throat, tinnitus, trouble swallowing and voice change.    Eyes:  Negative for photophobia, pain, discharge, redness, itching and visual disturbance.   Respiratory:  Negative for apnea, cough, choking, chest tightness, shortness of breath, wheezing and stridor.    Cardiovascular:  Negative for chest pain, palpitations and leg swelling.   Gastrointestinal:  Negative for abdominal distention, abdominal pain, anal bleeding, blood in stool, constipation, diarrhea, nausea and vomiting.   Endocrine: Negative for cold intolerance, heat intolerance, polydipsia, polyphagia and polyuria.   Genitourinary:  Negative for decreased urine volume, difficulty urinating, dysuria, flank pain, frequency, hematuria, penile discharge, scrotal swelling, testicular pain and urgency.   Musculoskeletal:  Negative for arthralgias, back pain, gait problem, joint swelling, myalgias, neck pain and neck stiffness.   Skin:  Negative for color change, rash and wound.   Allergic/Immunologic: Negative for environmental allergies, food allergies and immunocompromised state.   Neurological:  Negative for dizziness, tremors, seizures, syncope, facial asymmetry, weakness, light-headedness, numbness and headaches.   Hematological:  Negative for adenopathy. Does not bruise/bleed  "easily.   Psychiatric/Behavioral:  Negative for agitation, behavioral problems, confusion, decreased concentration, dysphoric mood, hallucinations, self-injury, sleep disturbance and suicidal ideas. The patient is not nervous/anxious and is not hyperactive.    All other systems reviewed and are negative.    Medications Ordered Prior to Encounter[1]   Social History     Tobacco Use   • Smoking status: Never   • Smokeless tobacco: Never   Vaping Use   • Vaping status: Never Used   Substance and Sexual Activity   • Alcohol use: No   • Drug use: No   • Sexual activity: Yes     Partners: Female       Objective   /82 (BP Location: Right arm, Patient Position: Sitting, Cuff Size: Standard)   Pulse 97   Temp (!) 97.4 °F (36.3 °C) (Tympanic)   Resp 16   Ht 6' 1\" (1.854 m)   Wt 113 kg (249 lb)   SpO2 98%   BMI 32.85 kg/m²     Physical Exam  Vitals and nursing note reviewed.   Constitutional:       General: He is not in acute distress.     Appearance: Normal appearance. He is obese. He is not ill-appearing, toxic-appearing or diaphoretic.   HENT:      Head: Normocephalic and atraumatic.      Right Ear: Tympanic membrane, ear canal and external ear normal. There is no impacted cerumen.      Left Ear: Tympanic membrane, ear canal and external ear normal. There is no impacted cerumen.      Nose: Nose normal. No congestion or rhinorrhea.      Mouth/Throat:      Mouth: Mucous membranes are moist.      Pharynx: Oropharynx is clear. No oropharyngeal exudate or posterior oropharyngeal erythema.     Eyes:      General: No scleral icterus.        Right eye: No discharge.         Left eye: No discharge.      Extraocular Movements: Extraocular movements intact.      Conjunctiva/sclera: Conjunctivae normal.      Pupils: Pupils are equal, round, and reactive to light.     Neck:      Vascular: No carotid bruit.     Cardiovascular:      Rate and Rhythm: Normal rate and regular rhythm.      Pulses: Normal pulses.      Heart " sounds: Normal heart sounds. No murmur heard.     No friction rub. No gallop.   Pulmonary:      Effort: Pulmonary effort is normal. No respiratory distress.      Breath sounds: Normal breath sounds. No stridor. No wheezing, rhonchi or rales.   Chest:      Chest wall: No tenderness.   Abdominal:      General: Bowel sounds are normal. There is no distension.      Palpations: Abdomen is soft. There is no mass.      Tenderness: There is no abdominal tenderness. There is no right CVA tenderness, left CVA tenderness, guarding or rebound.      Hernia: No hernia is present.     Musculoskeletal:         General: No swelling, tenderness, deformity or signs of injury. Normal range of motion.      Cervical back: Normal range of motion and neck supple. No rigidity or tenderness.      Right lower leg: No edema.      Left lower leg: No edema.   Lymphadenopathy:      Cervical: No cervical adenopathy.     Skin:     General: Skin is warm.      Capillary Refill: Capillary refill takes less than 2 seconds.      Coloration: Skin is not jaundiced or pale.      Findings: No bruising, erythema, lesion or rash.     Neurological:      General: No focal deficit present.      Mental Status: He is alert and oriented to person, place, and time.      Cranial Nerves: No cranial nerve deficit.      Sensory: No sensory deficit.      Motor: No weakness.      Coordination: Coordination normal.      Gait: Gait normal.      Deep Tendon Reflexes: Reflexes normal.     Psychiatric:         Attention and Perception: Attention and perception normal.         Mood and Affect: Mood and affect normal. Mood is not anxious, depressed or elated. Affect is not blunt, angry or inappropriate.         Speech: Speech normal.         Behavior: Behavior normal. Behavior is not agitated or hyperactive. Behavior is cooperative.         Thought Content: Thought content normal. Thought content is not paranoid or delusional. Thought content does not include homicidal ideation.  Thought content does not include homicidal plan.         Cognition and Memory: Cognition and memory normal.         Judgment: Judgment normal. Judgment is neither impulsive nor inappropriate.                [1]  Current Outpatient Medications on File Prior to Visit   Medication Sig Dispense Refill   • amphetamine-dextroamphetamine (ADDERALL XR, 20MG,) 20 MG 24 hr capsule Take 1 capsule (20 mg total) by mouth every morning Max Daily Amount: 20 mg 30 capsule 0   • amphetamine-dextroamphetamine (ADDERALL, 5MG,) 5 MG tablet Take 1 tablet (5 mg total) by mouth daily after lunch Max Daily Amount: 5 mg 30 tablet 0     No current facility-administered medications on file prior to visit.

## 2025-05-30 ENCOUNTER — APPOINTMENT (OUTPATIENT)
Dept: LAB | Facility: IMAGING CENTER | Age: 25
End: 2025-05-30
Payer: COMMERCIAL

## 2025-05-30 DIAGNOSIS — Z00.8 HEALTH EXAMINATION IN POPULATION SURVEY: ICD-10-CM

## 2025-05-30 LAB
BASOPHILS # BLD AUTO: 0.04 THOUSANDS/ÂΜL (ref 0–0.1)
BASOPHILS NFR BLD AUTO: 1 % (ref 0–1)
EOSINOPHIL # BLD AUTO: 0.11 THOUSAND/ÂΜL (ref 0–0.61)
EOSINOPHIL NFR BLD AUTO: 2 % (ref 0–6)
ERYTHROCYTE [DISTWIDTH] IN BLOOD BY AUTOMATED COUNT: 12.3 % (ref 11.6–15.1)
EST. AVERAGE GLUCOSE BLD GHB EST-MCNC: 105 MG/DL
HBA1C MFR BLD: 5.3 %
HCT VFR BLD AUTO: 47.1 % (ref 36.5–49.3)
HGB BLD-MCNC: 15.6 G/DL (ref 12–17)
IMM GRANULOCYTES # BLD AUTO: 0.01 THOUSAND/UL (ref 0–0.2)
IMM GRANULOCYTES NFR BLD AUTO: 0 % (ref 0–2)
LYMPHOCYTES # BLD AUTO: 1.83 THOUSANDS/ÂΜL (ref 0.6–4.47)
LYMPHOCYTES NFR BLD AUTO: 39 % (ref 14–44)
MCH RBC QN AUTO: 28.5 PG (ref 26.8–34.3)
MCHC RBC AUTO-ENTMCNC: 33.1 G/DL (ref 31.4–37.4)
MCV RBC AUTO: 86 FL (ref 82–98)
MONOCYTES # BLD AUTO: 0.48 THOUSAND/ÂΜL (ref 0.17–1.22)
MONOCYTES NFR BLD AUTO: 10 % (ref 4–12)
NEUTROPHILS # BLD AUTO: 2.28 THOUSANDS/ÂΜL (ref 1.85–7.62)
NEUTS SEG NFR BLD AUTO: 48 % (ref 43–75)
NRBC BLD AUTO-RTO: 0 /100 WBCS
PLATELET # BLD AUTO: 311 THOUSANDS/UL (ref 149–390)
PMV BLD AUTO: 10.4 FL (ref 8.9–12.7)
RBC # BLD AUTO: 5.47 MILLION/UL (ref 3.88–5.62)
WBC # BLD AUTO: 4.75 THOUSAND/UL (ref 4.31–10.16)

## 2025-05-31 LAB
ALBUMIN SERPL BCG-MCNC: 4.7 G/DL (ref 3.5–5)
ALP SERPL-CCNC: 63 U/L (ref 34–104)
ALT SERPL W P-5'-P-CCNC: 32 U/L (ref 7–52)
ANION GAP SERPL CALCULATED.3IONS-SCNC: 15 MMOL/L (ref 4–13)
AST SERPL W P-5'-P-CCNC: 26 U/L (ref 13–39)
BILIRUB SERPL-MCNC: 0.52 MG/DL (ref 0.2–1)
BUN SERPL-MCNC: 13 MG/DL (ref 5–25)
CALCIUM SERPL-MCNC: 10 MG/DL (ref 8.4–10.2)
CHLORIDE SERPL-SCNC: 105 MMOL/L (ref 96–108)
CHOLEST SERPL-MCNC: 215 MG/DL (ref ?–200)
CO2 SERPL-SCNC: 24 MMOL/L (ref 21–32)
CREAT SERPL-MCNC: 0.94 MG/DL (ref 0.6–1.3)
GFR SERPL CREATININE-BSD FRML MDRD: 111 ML/MIN/1.73SQ M
GLUCOSE P FAST SERPL-MCNC: 88 MG/DL (ref 65–99)
HCV AB SER QL: NORMAL
HDLC SERPL-MCNC: 76 MG/DL
HIV 1+2 AB+HIV1 P24 AG SERPL QL IA: NORMAL
LDLC SERPL CALC-MCNC: 121 MG/DL (ref 0–100)
NONHDLC SERPL-MCNC: 139 MG/DL
POTASSIUM SERPL-SCNC: 4.1 MMOL/L (ref 3.5–5.3)
PROT SERPL-MCNC: 7.9 G/DL (ref 6.4–8.4)
SODIUM SERPL-SCNC: 144 MMOL/L (ref 135–147)
TREPONEMA PALLIDUM IGG+IGM AB [PRESENCE] IN SERUM OR PLASMA BY IMMUNOASSAY: NORMAL
TRIGL SERPL-MCNC: 92 MG/DL (ref ?–150)

## 2025-06-01 LAB
C TRACH DNA SPEC QL NAA+PROBE: POSITIVE
N GONORRHOEA DNA SPEC QL NAA+PROBE: NEGATIVE

## 2025-06-02 ENCOUNTER — RESULTS FOLLOW-UP (OUTPATIENT)
Dept: FAMILY MEDICINE CLINIC | Facility: CLINIC | Age: 25
End: 2025-06-02

## 2025-06-02 DIAGNOSIS — A74.9 CHLAMYDIA INFECTION: Primary | ICD-10-CM

## 2025-06-02 LAB
HSV2 IGG SERPL QL IA: NEGATIVE
HSV2 IGG SERPL QL IA: NEGATIVE

## 2025-06-02 RX ORDER — DOXYCYCLINE HYCLATE 100 MG
100 TABLET ORAL 2 TIMES DAILY
Qty: 14 TABLET | Refills: 0 | Status: SHIPPED | OUTPATIENT
Start: 2025-06-02 | End: 2025-06-09

## 2025-06-03 RX ORDER — DOXYCYCLINE 100 MG/1
100 CAPSULE ORAL EVERY 12 HOURS SCHEDULED
Qty: 2 CAPSULE | Refills: 0 | Status: SHIPPED | OUTPATIENT
Start: 2025-06-03 | End: 2025-06-04

## 2025-06-17 ENCOUNTER — TELEPHONE (OUTPATIENT)
Dept: PSYCHIATRY | Facility: CLINIC | Age: 25
End: 2025-06-17

## 2025-06-17 NOTE — TELEPHONE ENCOUNTER
Called pt regarding cancelling upcoming appt on 6/20/25 with Dr. Ace due to provider bereavement. Pt could not r/s at the time due to being at work.    Gave [facility] phone number to call back and schedule with provider.

## 2025-07-22 ENCOUNTER — TELEPHONE (OUTPATIENT)
Age: 25
End: 2025-07-22

## 2025-07-30 ENCOUNTER — TELEMEDICINE (OUTPATIENT)
Dept: PSYCHIATRY | Facility: CLINIC | Age: 25
End: 2025-07-30
Payer: COMMERCIAL

## 2025-07-30 DIAGNOSIS — F90.1 ADHD, PREDOMINANTLY HYPERACTIVE TYPE: ICD-10-CM

## 2025-07-30 PROCEDURE — 90833 PSYTX W PT W E/M 30 MIN: CPT | Performed by: STUDENT IN AN ORGANIZED HEALTH CARE EDUCATION/TRAINING PROGRAM

## 2025-07-30 PROCEDURE — 99213 OFFICE O/P EST LOW 20 MIN: CPT | Performed by: STUDENT IN AN ORGANIZED HEALTH CARE EDUCATION/TRAINING PROGRAM

## 2025-07-30 RX ORDER — DEXTROAMPHETAMINE SACCHARATE, AMPHETAMINE ASPARTATE, DEXTROAMPHETAMINE SULFATE AND AMPHETAMINE SULFATE 1.25; 1.25; 1.25; 1.25 MG/1; MG/1; MG/1; MG/1
5 TABLET ORAL
Qty: 30 TABLET | Refills: 0 | Status: SHIPPED | OUTPATIENT
Start: 2025-09-30 | End: 2025-10-30

## 2025-07-30 RX ORDER — DEXTROAMPHETAMINE SACCHARATE, AMPHETAMINE ASPARTATE, DEXTROAMPHETAMINE SULFATE AND AMPHETAMINE SULFATE 1.25; 1.25; 1.25; 1.25 MG/1; MG/1; MG/1; MG/1
5 TABLET ORAL
Qty: 30 TABLET | Refills: 0 | Status: SHIPPED | OUTPATIENT
Start: 2025-08-30 | End: 2025-09-29

## 2025-07-30 RX ORDER — DEXTROAMPHETAMINE SACCHARATE, AMPHETAMINE ASPARTATE MONOHYDRATE, DEXTROAMPHETAMINE SULFATE AND AMPHETAMINE SULFATE 5; 5; 5; 5 MG/1; MG/1; MG/1; MG/1
20 CAPSULE, EXTENDED RELEASE ORAL EVERY MORNING
Qty: 30 CAPSULE | Refills: 0 | Status: SHIPPED | OUTPATIENT
Start: 2025-07-30

## 2025-07-30 RX ORDER — DEXTROAMPHETAMINE SACCHARATE, AMPHETAMINE ASPARTATE, DEXTROAMPHETAMINE SULFATE AND AMPHETAMINE SULFATE 1.25; 1.25; 1.25; 1.25 MG/1; MG/1; MG/1; MG/1
5 TABLET ORAL
Qty: 30 TABLET | Refills: 0 | Status: SHIPPED | OUTPATIENT
Start: 2025-07-30 | End: 2025-08-29

## 2025-07-30 RX ORDER — DEXTROAMPHETAMINE SACCHARATE, AMPHETAMINE ASPARTATE MONOHYDRATE, DEXTROAMPHETAMINE SULFATE AND AMPHETAMINE SULFATE 5; 5; 5; 5 MG/1; MG/1; MG/1; MG/1
20 CAPSULE, EXTENDED RELEASE ORAL EVERY MORNING
Qty: 30 CAPSULE | Refills: 0 | Status: SHIPPED | OUTPATIENT
Start: 2025-09-30

## 2025-07-30 RX ORDER — DEXTROAMPHETAMINE SACCHARATE, AMPHETAMINE ASPARTATE MONOHYDRATE, DEXTROAMPHETAMINE SULFATE AND AMPHETAMINE SULFATE 5; 5; 5; 5 MG/1; MG/1; MG/1; MG/1
20 CAPSULE, EXTENDED RELEASE ORAL EVERY MORNING
Qty: 30 CAPSULE | Refills: 0 | Status: SHIPPED | OUTPATIENT
Start: 2025-08-30

## 2025-08-01 ENCOUNTER — TELEPHONE (OUTPATIENT)
Dept: PSYCHIATRY | Facility: CLINIC | Age: 25
End: 2025-08-01

## 2025-08-07 ENCOUNTER — TELEPHONE (OUTPATIENT)
Dept: PSYCHIATRY | Facility: CLINIC | Age: 25
End: 2025-08-07